# Patient Record
Sex: FEMALE | Race: WHITE | NOT HISPANIC OR LATINO | Employment: OTHER | URBAN - NONMETROPOLITAN AREA
[De-identification: names, ages, dates, MRNs, and addresses within clinical notes are randomized per-mention and may not be internally consistent; named-entity substitution may affect disease eponyms.]

---

## 2017-09-06 ENCOUNTER — TRANSCRIBE ORDERS (OUTPATIENT)
Dept: ADMINISTRATIVE | Facility: HOSPITAL | Age: 77
End: 2017-09-06

## 2017-09-06 DIAGNOSIS — Z12.31 VISIT FOR SCREENING MAMMOGRAM: Primary | ICD-10-CM

## 2017-10-05 ENCOUNTER — APPOINTMENT (OUTPATIENT)
Dept: MAMMOGRAPHY | Facility: HOSPITAL | Age: 77
End: 2017-10-05
Attending: FAMILY MEDICINE

## 2017-10-13 ENCOUNTER — APPOINTMENT (OUTPATIENT)
Dept: MAMMOGRAPHY | Facility: HOSPITAL | Age: 77
End: 2017-10-13
Attending: FAMILY MEDICINE

## 2017-11-20 ENCOUNTER — TRANSCRIBE ORDERS (OUTPATIENT)
Dept: ADMINISTRATIVE | Facility: HOSPITAL | Age: 77
End: 2017-11-20

## 2017-11-20 DIAGNOSIS — Z12.31 VISIT FOR SCREENING MAMMOGRAM: Primary | ICD-10-CM

## 2017-12-21 ENCOUNTER — HOSPITAL ENCOUNTER (OUTPATIENT)
Dept: MAMMOGRAPHY | Facility: HOSPITAL | Age: 77
Discharge: HOME | End: 2017-12-21
Attending: FAMILY MEDICINE | Admitting: FAMILY MEDICINE

## 2017-12-21 DIAGNOSIS — Z12.31 VISIT FOR SCREENING MAMMOGRAM: ICD-10-CM

## 2017-12-21 PROCEDURE — G0202 SCR MAMMO BI INCL CAD: HCPCS

## 2017-12-21 PROCEDURE — 77063 BREAST TOMOSYNTHESIS BI: CPT

## 2017-12-21 PROCEDURE — 77063 BREAST TOMOSYNTHESIS BI: CPT | Performed by: RADIOLOGY

## 2017-12-21 PROCEDURE — G0202 SCR MAMMO BI INCL CAD: HCPCS | Performed by: RADIOLOGY

## 2017-12-22 ENCOUNTER — EPISODE CHANGES (OUTPATIENT)
Dept: CASE MANAGEMENT | Facility: OTHER | Age: 77
End: 2017-12-22

## 2018-06-01 ENCOUNTER — TRANSCRIBE ORDERS (OUTPATIENT)
Dept: CARDIOLOGY | Facility: HOSPITAL | Age: 78
End: 2018-06-01

## 2018-06-01 DIAGNOSIS — R01.1 HEART MURMUR: Primary | ICD-10-CM

## 2018-06-07 ENCOUNTER — HOSPITAL ENCOUNTER (OUTPATIENT)
Dept: CARDIOLOGY | Facility: HOSPITAL | Age: 78
Discharge: HOME | End: 2018-06-07
Admitting: PHYSICIAN ASSISTANT

## 2018-06-07 DIAGNOSIS — R01.1 HEART MURMUR: ICD-10-CM

## 2018-06-07 LAB
BH CV ECHO MEAS - % IVS THICK: -25 %
BH CV ECHO MEAS - % LVPW THICK: 17.8 %
BH CV ECHO MEAS - ACS: 1.6 CM
BH CV ECHO MEAS - AO MAX PG: 21.6 MMHG
BH CV ECHO MEAS - AO MEAN PG: 9.4 MMHG
BH CV ECHO MEAS - AO ROOT AREA (BSA CORRECTED): 1.5
BH CV ECHO MEAS - AO ROOT AREA: 5.9 CM^2
BH CV ECHO MEAS - AO ROOT DIAM: 2.7 CM
BH CV ECHO MEAS - AO V2 MAX: 232.4 CM/SEC
BH CV ECHO MEAS - AO V2 MEAN: 137.9 CM/SEC
BH CV ECHO MEAS - AO V2 VTI: 56.3 CM
BH CV ECHO MEAS - BSA(HAYCOCK): 1.9 M^2
BH CV ECHO MEAS - BSA: 1.8 M^2
BH CV ECHO MEAS - BZI_BMI: 31.4 KILOGRAMS/M^2
BH CV ECHO MEAS - BZI_METRIC_HEIGHT: 160 CM
BH CV ECHO MEAS - BZI_METRIC_WEIGHT: 80.3 KG
BH CV ECHO MEAS - CONTRAST EF 4CH: 70.2 ML/M^2
BH CV ECHO MEAS - EDV(CUBED): 82.2 ML
BH CV ECHO MEAS - EDV(MOD-SP4): 47 ML
BH CV ECHO MEAS - EDV(TEICH): 85.3 ML
BH CV ECHO MEAS - EF(CUBED): 65.4 %
BH CV ECHO MEAS - EF(MOD-SP4): 70.2 %
BH CV ECHO MEAS - EF(TEICH): 57.2 %
BH CV ECHO MEAS - ESV(CUBED): 28.4 ML
BH CV ECHO MEAS - ESV(MOD-SP4): 14 ML
BH CV ECHO MEAS - ESV(TEICH): 36.5 ML
BH CV ECHO MEAS - FS: 29.8 %
BH CV ECHO MEAS - IVS/LVPW: 1.4
BH CV ECHO MEAS - IVSD: 1.5 CM
BH CV ECHO MEAS - IVSS: 1.1 CM
BH CV ECHO MEAS - LA DIMENSION: 3.9 CM
BH CV ECHO MEAS - LA/AO: 1.4
BH CV ECHO MEAS - LV DIASTOLIC VOL/BSA (35-75): 25.6 ML/M^2
BH CV ECHO MEAS - LV MASS(C)D: 202.3 GRAMS
BH CV ECHO MEAS - LV MASS(C)DI: 110.2 GRAMS/M^2
BH CV ECHO MEAS - LV MASS(C)S: 107.8 GRAMS
BH CV ECHO MEAS - LV MASS(C)SI: 58.7 GRAMS/M^2
BH CV ECHO MEAS - LV SYSTOLIC VOL/BSA (12-30): 7.6 ML/M^2
BH CV ECHO MEAS - LVIDD: 4.3 CM
BH CV ECHO MEAS - LVIDS: 3.1 CM
BH CV ECHO MEAS - LVLD AP4: 5.5 CM
BH CV ECHO MEAS - LVLS AP4: 4 CM
BH CV ECHO MEAS - LVOT AREA (M): 2.3 CM^2
BH CV ECHO MEAS - LVOT AREA: 2.3 CM^2
BH CV ECHO MEAS - LVOT DIAM: 1.7 CM
BH CV ECHO MEAS - LVPWD: 1.1 CM
BH CV ECHO MEAS - LVPWS: 1.2 CM
BH CV ECHO MEAS - MV A MAX VEL: 113.5 CM/SEC
BH CV ECHO MEAS - MV E MAX VEL: 107.1 CM/SEC
BH CV ECHO MEAS - MV E/A: 0.94
BH CV ECHO MEAS - PA ACC SLOPE: 1195 CM/SEC^2
BH CV ECHO MEAS - PA ACC TIME: 0.11 SEC
BH CV ECHO MEAS - PA PR(ACCEL): 31.5 MMHG
BH CV ECHO MEAS - RAP SYSTOLE: 10 MMHG
BH CV ECHO MEAS - RVDD: 0.81 CM
BH CV ECHO MEAS - RVSP: 82.5 MMHG
BH CV ECHO MEAS - SI(AO): 180.6 ML/M^2
BH CV ECHO MEAS - SI(CUBED): 29.3 ML/M^2
BH CV ECHO MEAS - SI(MOD-SP4): 18 ML/M^2
BH CV ECHO MEAS - SI(TEICH): 26.6 ML/M^2
BH CV ECHO MEAS - SV(AO): 331.5 ML
BH CV ECHO MEAS - SV(CUBED): 53.8 ML
BH CV ECHO MEAS - SV(MOD-SP4): 33 ML
BH CV ECHO MEAS - SV(TEICH): 48.8 ML
BH CV ECHO MEAS - TR MAX VEL: 425.7 CM/SEC

## 2018-06-07 PROCEDURE — 93306 TTE W/DOPPLER COMPLETE: CPT

## 2018-06-07 PROCEDURE — 93306 TTE W/DOPPLER COMPLETE: CPT | Performed by: INTERNAL MEDICINE

## 2018-07-30 ENCOUNTER — TRANSCRIBE ORDERS (OUTPATIENT)
Dept: CARDIOLOGY | Facility: CLINIC | Age: 78
End: 2018-07-30

## 2018-07-30 ENCOUNTER — HOSPITAL ENCOUNTER (OUTPATIENT)
Dept: GENERAL RADIOLOGY | Facility: HOSPITAL | Age: 78
Discharge: HOME | End: 2018-07-30
Admitting: INTERNAL MEDICINE

## 2018-07-30 DIAGNOSIS — I05.0 MITRAL VALVE STENOSIS, UNSPECIFIED ETIOLOGY: ICD-10-CM

## 2018-07-30 DIAGNOSIS — I05.0 MITRAL VALVE STENOSIS, UNSPECIFIED ETIOLOGY: Primary | ICD-10-CM

## 2018-07-30 PROCEDURE — 71046 X-RAY EXAM CHEST 2 VIEWS: CPT | Performed by: RADIOLOGY

## 2018-07-30 PROCEDURE — 71046 X-RAY EXAM CHEST 2 VIEWS: CPT

## 2018-08-07 ENCOUNTER — OFFICE VISIT (OUTPATIENT)
Dept: CARDIOLOGY | Facility: CLINIC | Age: 78
End: 2018-08-07

## 2018-08-07 VITALS
WEIGHT: 178 LBS | SYSTOLIC BLOOD PRESSURE: 160 MMHG | HEART RATE: 77 BPM | BODY MASS INDEX: 32.76 KG/M2 | OXYGEN SATURATION: 94 % | HEIGHT: 62 IN | DIASTOLIC BLOOD PRESSURE: 67 MMHG

## 2018-08-07 DIAGNOSIS — I05.9 MITRAL VALVE DISORDER: ICD-10-CM

## 2018-08-07 DIAGNOSIS — R94.39 ABNORMAL STRESS TEST: Primary | ICD-10-CM

## 2018-08-07 PROCEDURE — 93000 ELECTROCARDIOGRAM COMPLETE: CPT | Performed by: INTERNAL MEDICINE

## 2018-08-07 PROCEDURE — 99213 OFFICE O/P EST LOW 20 MIN: CPT | Performed by: INTERNAL MEDICINE

## 2018-08-07 RX ORDER — MECLIZINE HYDROCHLORIDE 25 MG/1
25 TABLET ORAL 3 TIMES DAILY PRN
COMMUNITY

## 2018-08-07 RX ORDER — NITROGLYCERIN 0.4 MG/1
0.4 TABLET SUBLINGUAL
COMMUNITY

## 2018-08-07 RX ORDER — TIMOLOL MALEATE 5 MG/ML
1 SOLUTION/ DROPS OPHTHALMIC 2 TIMES DAILY
COMMUNITY

## 2018-08-07 RX ORDER — PANTOPRAZOLE SODIUM 40 MG/1
40 TABLET, DELAYED RELEASE ORAL DAILY
COMMUNITY

## 2018-08-07 RX ORDER — BRIMONIDINE TARTRATE 2 MG/ML
1 SOLUTION/ DROPS OPHTHALMIC 2 TIMES DAILY
COMMUNITY

## 2018-08-07 NOTE — PROGRESS NOTES
"[facility]  INITIAL OFFICE CONSULT    Elmo Martinez  : 1940  MRN:[id number]  Home Phone:[phone]  Patient Address: [facility] [place] [address]    Date of Encounter: 2018    PCP: Emmanuel Marcum MD  [address] [address]  Referring MD: SILVIANO Mauricio      IDENTIFICATION: A 77 y.o. female     Chief Complaint   Patient presents with   • Mitral valve stenosis   Pulmonary HTN by echocardiogram    PROBLEM LIST:   1. Chest pain  1. Echocardiogram with LVEF >65%, mild LVH 3/29/13  2. Normal regadenoson cardiolite stress with LVEF 76% 3/29/13  3. Echocardiogram LVEF 60%-65%, mod MR, mild to mod TR and evidence severe pulmonary HTN 16  4. Abnormal lexiscan nuclear stress 16  5. LHC, 2016:  Normal LV function, normal coronaries, normal pulmonary artery pressures.  6. Echo, 2018:  ... \"severe PA hypertension\" and mild MS\"  2. Hypertension  3. Untreated dyslipidemia  4. Diabetes Mellitus with neuropathy  5. Palpitations   6. GERD and hiatal hernia  7. Osteoarthritis  8. Skin cancer  9. Glaucoma  10. History of gout  11. Scoliosis  12. Small stomach ulcers  13. Surgical history: hysterectomy, glaucoma, appendectomy      Allergies   Allergen Reactions   • Codeine Mental Status Change   • Meperidine Mental Status Change   • Statins Myalgia     Zocor, lipitor, welchol and pravachol   • Sulfa Antibiotics Rash   • Other Swelling     Swelling at site of insertion   • Flu Virus Vaccine Swelling       CURRENT MEDICATIONS:   •  ALPRAZolam 0.5 mg by mouth 2 (two) times a day as needed.    •  amLODIPine 5 mg by mouth daily  •  aspirin 81 MG by mouth daily  •  brimonidine (ALPHAGAN) 0.2 % ophthalmic solution, 1 drop 2 (Two) Times a Day.  •  diclofenac 75 mg by mouth 2 (Two) Times a Day  •  FIBER PO, Take  by mouth Daily  •  gabapentin 800 MG by mouth 2 (two) times a day.  •  HYDROcodone-acetaminophen (NORCO) 7.5-325 MG by mouth every 12 (twelve) " "hours as needed   •  latanoprost (XALATAN) 0.005 % ophthalmic solution, Administer 1 drop to both eyes every night  •  lisinopril 20 mg by mouth 2 (two) times a day.  •  meclizine 25 mg by mouth 3 (Three) Times a Day As Needed for dizziness  •  metoprolol tartrate 50 mg by mouth 2 (two) times a day  •  nitroglycerin (NITROSTAT) 0.4 MG SL As Needed for Chest Pain.    •  Omega-3 Fatty Acids by mouth Daily.  •  pantoprazole 40 MG EC by mouth Daily.  •  Pseudoephedrine-Acetaminophen by mouth Daily As Needed.  •  timolol (TIMOPTIC) 0.5 % ophthalmic solution, Administer 1 drop to both eyes 2 (Two) Times a Day.  •  vitamin E 200 Units by mouth Daily.      HPI:  The patient returns for follow-up today.  This pleasant elderly woman underwent studies in the [facility] catheter labs 2 years ago, in June 2016.  They were to evaluate \"mixed feature\" chest pain is well as severe pulmonary artery hypertension (the diagnosis was made by an echocardiogram).  The patient was found to have normal LV function, normal coronary arteries, and normal pulmonary artery pressure.    In follow-up with her primary care physician recently she was found to have a?  New heart murmur and an echocardiogram was done.  The echocardiogram was again interpreted as showing severe pulmonary artery hypertension as well as, on this occasion, mild AI and the mild mitral stenosis.  She was referred back to me.  She has some complaints of easy fatigability and ill-defined exertional dyspnea which is basically been unchanged over the last 2 years.  She has not had chest pain.      Cardiac Risk Factors include: advanced age (older than 55 for men, 65 for women), diabetes mellitus, dyslipidemia, hypertension and sedentary lifestyle    ROS: All systems have been reviewed and are negative with the exception of those mentioned in the HPI and problem list above.    Surgical History:   Past Surgical History:   Procedure Laterality Date   • CARDIAC CATHETERIZATION N/A " 6/22/2016    Procedure: right and left heart, abnormal stress, abnormal echo revealing pulm HTN;  Surgeon: Pancho Casey MD;  Location: [facility] INVASIVE LOCATION;  Service:    • CATARACT EXTRACTION     • COLONOSCOPY      2015   • GLAUCOMA SURGERY     • HYSTERECTOMY     • TUBAL ABDOMINAL LIGATION         Social History:   Social History     Social History   • Marital status:      Spouse name: N/A   • Number of children: N/A   • Years of education: N/A     Occupational History   • Not on file.     Social History Main Topics   • Smoking status: Never Smoker   • Smokeless tobacco: Not on file   • Alcohol use No   • Drug use: No   • Sexual activity: Defer     Other Topics Concern   • Not on file     Social History Narrative   • No narrative on file       Family History:   Family History   Problem Relation Age of Onset   • Cancer Other    • Diabetes Other    • Gout Other    • Heart disease Other    • Hypertension Other    • Osteoarthritis Other    • Osteoporosis Other    • Rheum arthritis Other    • COPD Father    • Cancer Sister    • Breast cancer Neg Hx        Objective     Vitals:    08/07/18 1106 08/07/18 1107 08/07/18 1108 08/07/18 1109   BP: 155/61 148/61 158/52 160/67   BP Location: Right arm Right arm Left arm Left arm   Patient Position: Sitting Standing Standing Sitting   Pulse:  77 77 77   SpO2:       Weight:       Height:         Body mass index is 32.56 kg/m².    PHYSICAL EXAM:  CONSTITUTIONAL: Well nourished, cooperative, in no acute distress  HEENT: Normocephalic, atraumatic, PERRLA, no JVD, no carotid bruit  CARDIOVASCULAR:  Regular rhythm and normal rate, no gallop, rub. There is a faint systolic murmur wothout any diastolic component. Peripheral pulses are present and equal bilaterally  RESPIRATORY: Clear to auscultation, normal respiratory effort, no wheezing, rales or ronchi  GI: Soft, nontender, normal bowel sounds  MUSCULOSKELETAL: No gross deformities, no edema  SKIN: Warm, dry. No  "bleeding, bruising or rash  NEUROLOGICAL: No focal deficits  PSYCHIATRIC: Normal mood and affect. Behavior is normal     Labs/Diagnostic Data  6/25/2018  CBC:  WBC 7.7, HGB 10.6, HCT 34.2, PLTS 186  CMP: , BUN 10, CR 0.68, NA 4.9, K 4.9, AST 12, ALT 11  HGB A1C: 6.3  LIPID: , HDL 41, , TRIG 284  TSH: 2.870  T4: 1.06                                  ECG 12 Lead  Date/Time: 8/7/2018 1:59 PM  Performed by: ROSHAN KERNS  Authorized by: ROSHAN KERNS   Comparison: compared with previous ECG from 6/14/2016  Similar to previous ECG  Rhythm: sinus rhythm  BPM: 72  Clinical impression: normal ECG            Radiology Data:   7/30/2018  CXR:  No evidence of active disease in the chest.     Assessment and Plan:     1. Pulmonary artery hypertension: This patient's echo is unchanged from her echo in 2000 which suggests pulmonary artery hypertension.  Catheterization studies done in 2014 after the first echo clearly document normal pulmonary artery pressures by direct measurement with a Miracle-Imani catheter.  I therefore think that this is a spurious finding as the echocardiogram appears to be significantly overestimating her pulmonary pressures.  I would not pursue this further.  2.  Echocardiographic findings of \"mild mitral stenosis\".  I reviewed the echo myself.  This is not a rheumatic mitral valve.  Leaflets are probably a bit thickened.  There is mitral annular calcification.  I do not have recorded velocities from the study; however, while I cannot absolutely exclude a mild obstruction to LV inflow from mitral annular calcification, this patient does not have mitral stenosis.  3.  The patient's blood pressure is not at target.  She will follow-up with Emmanuel Marcum M.D. in terms of blood pressure control and other risk factor modification measures as indicated.  I do not think that she needs further cardiac study at this time.    Thank you for allowing me to participate in the care of Elmo CUBA " Juan. Feel free to contact me directly with any further questions or concerns.    Scribed for Pancho Casey MD by Amita Lassiter RN. 8/7/2018  11:50 AM

## 2019-09-18 ENCOUNTER — APPOINTMENT (OUTPATIENT)
Dept: GENERAL RADIOLOGY | Facility: HOSPITAL | Age: 79
End: 2019-09-18

## 2019-09-18 ENCOUNTER — OFFICE VISIT (OUTPATIENT)
Dept: ORTHOPEDIC SURGERY | Facility: CLINIC | Age: 79
End: 2019-09-18

## 2019-09-18 VITALS
WEIGHT: 180 LBS | HEIGHT: 63 IN | DIASTOLIC BLOOD PRESSURE: 74 MMHG | BODY MASS INDEX: 31.89 KG/M2 | SYSTOLIC BLOOD PRESSURE: 150 MMHG | HEART RATE: 71 BPM

## 2019-09-18 DIAGNOSIS — Z91.81 HISTORY OF RECENT FALL: ICD-10-CM

## 2019-09-18 DIAGNOSIS — M25.562 ACUTE PAIN OF LEFT KNEE: Primary | ICD-10-CM

## 2019-09-18 PROCEDURE — 20610 DRAIN/INJ JOINT/BURSA W/O US: CPT | Performed by: ORTHOPAEDIC SURGERY

## 2019-09-18 PROCEDURE — 99203 OFFICE O/P NEW LOW 30 MIN: CPT | Performed by: ORTHOPAEDIC SURGERY

## 2019-09-18 RX ORDER — LIDOCAINE HYDROCHLORIDE 20 MG/ML
2 INJECTION, SOLUTION INFILTRATION; PERINEURAL
Status: COMPLETED | OUTPATIENT
Start: 2019-09-18 | End: 2019-09-18

## 2019-09-18 RX ORDER — METHYLPREDNISOLONE ACETATE 40 MG/ML
40 INJECTION, SUSPENSION INTRA-ARTICULAR; INTRALESIONAL; INTRAMUSCULAR; SOFT TISSUE
Status: COMPLETED | OUTPATIENT
Start: 2019-09-18 | End: 2019-09-18

## 2019-09-18 RX ADMIN — LIDOCAINE HYDROCHLORIDE 2 ML: 20 INJECTION, SOLUTION INFILTRATION; PERINEURAL at 16:37

## 2019-09-18 RX ADMIN — METHYLPREDNISOLONE ACETATE 40 MG: 40 INJECTION, SUSPENSION INTRA-ARTICULAR; INTRALESIONAL; INTRAMUSCULAR; SOFT TISSUE at 16:37

## 2019-09-18 NOTE — PROGRESS NOTES
Follow-up Visit         Patient: Elmo Martinez  YOB: 1940  Date of Encounter: 09/18/2019      Chief  Complaint: No chief complaint on file.        HPI:  Elmo Martinez, 79 y.o. female     Medical History:  Patient Active Problem List   Diagnosis   • Abnormal stress test   • Pulmonary artery hypertension (CMS/HCC)   • Mitral valve disorder     Past Medical History:   Diagnosis Date   • Balance problem     lack of balance between leisure activites and work   • Coronary artery disease    • Diabetes mellitus (CMS/HCC)     diet controlled. Checks blood sugar once a day. diagnosed 4 years   • GERD (gastroesophageal reflux disease)    • Glaucoma    • Gout    • Heart palpitations    • Hiatal hernia    • History of transfusion     1963   • Hypercholesterolemia    • Hypertension    • Low back pain    • Mitral valve stenosis    • Osteoarthritis    • Osteoporosis    • PONV (postoperative nausea and vomiting)    • Scoliosis    • Skin cancer    • Spinal headache     mild   • Wears glasses        Social History:  Social History     Socioeconomic History   • Marital status:      Spouse name: Not on file   • Number of children: Not on file   • Years of education: Not on file   • Highest education level: Not on file   Tobacco Use   • Smoking status: Never Smoker   Substance and Sexual Activity   • Alcohol use: No   • Drug use: No   • Sexual activity: Defer       Surgical History:  Past Surgical History:   Procedure Laterality Date   • CARDIAC CATHETERIZATION N/A 6/22/2016    Procedure: right and left heart, abnormal stress, abnormal echo revealing pulm HTN;  Surgeon: Pancho Casey MD;  Location: [facility] INVASIVE LOCATION;  Service:    • CATARACT EXTRACTION     • COLONOSCOPY      2015   • GLAUCOMA SURGERY     • HYSTERECTOMY     • TUBAL ABDOMINAL LIGATION         Radiology:       Examination:       Assessment & Plan:   79 y.o. female          Diagnosis Plan   1. Left knee pain, unspecified chronicity  XR  Knee 3 View Left             Cc:  Emmanuel Marcum MD              This document has been electronically signed by Tomas Mcgraw MD   September 18, 2019 1:19 PM

## 2019-09-18 NOTE — PROGRESS NOTES
New Patient Visit      Patient: Elmo Martinez  YOB: 1940  Date of Encounter: 09/18/2019        Chief Complaint:   Chief Complaint   Patient presents with   • Left Knee - Pain, Initial Evaluation         HPI:   Elmo Martinez, 79 y.o. female, referred by Emmanuel Marcum MD for evaluation of left knee injury sustained June 29, 2019.  Approximately 2-1/2 months ago was putting up a water hose when she tripped and fell forward.  She felt as though her knee popped.  Since then she has had stiffness in her knee and pain with weightbearing.  She denies problems with her left knee prior to this injury.  She is known to have osteoarthritis but was not aware of involvement to her left knee.  She denies a history of rheumatoid arthritis or family history of rheumatoid arthritis.  Currently she continues to have pain with ambulation she has moderate pain at rest.  She has not experienced additional popping sensation in her knee.      Active Problem List:  Patient Active Problem List   Diagnosis   • Abnormal stress test   • Pulmonary artery hypertension (CMS/HCC)   • Mitral valve disorder         Past Medical History:  Past Medical History:   Diagnosis Date   • Balance problem     lack of balance between leisure activites and work   • Coronary artery disease    • Diabetes mellitus (CMS/HCC)     diet controlled. Checks blood sugar once a day. diagnosed 4 years   • GERD (gastroesophageal reflux disease)    • Glaucoma    • Gout    • Heart palpitations    • Hiatal hernia    • History of transfusion     1963   • Hypercholesterolemia    • Hypertension    • Low back pain    • Mitral valve stenosis    • Osteoarthritis    • Osteoporosis    • PONV (postoperative nausea and vomiting)    • Scoliosis    • Skin cancer    • Spinal headache     mild   • Wears glasses          Past Surgical History:  Past Surgical History:   Procedure Laterality Date   • CARDIAC CATHETERIZATION N/A 6/22/2016    Procedure: right and left heart,  abnormal stress, abnormal echo revealing pulm HTN;  Surgeon: Pancho Casey MD;  Location: [facility] INVASIVE LOCATION;  Service:    • CATARACT EXTRACTION     • COLONOSCOPY      2015   • GLAUCOMA SURGERY     • HYSTERECTOMY     • TUBAL ABDOMINAL LIGATION           Family History:  Family History   Problem Relation Age of Onset   • Cancer Other    • Diabetes Other    • Gout Other    • Heart disease Other    • Hypertension Other    • Osteoarthritis Other    • Osteoporosis Other    • Rheum arthritis Other    • Osteoarthritis Mother    • Cancer Mother    • Hypertension Mother    • COPD Father    • Cancer Sister    • Osteoarthritis Sister    • Hypertension Sister    • Cancer Maternal Grandfather    • Breast cancer Neg Hx          Social History:  Social History     Socioeconomic History   • Marital status:      Spouse name: Not on file   • Number of children: Not on file   • Years of education: Not on file   • Highest education level: Not on file   Tobacco Use   • Smoking status: Never Smoker   • Smokeless tobacco: Never Used   Substance and Sexual Activity   • Alcohol use: No   • Drug use: No   • Sexual activity: Defer     Body mass index is 31.89 kg/m².      Medications:  Current Outpatient Medications   Medication Sig Dispense Refill   • ALPRAZolam (XANAX) 0.5 MG tablet Take 0.5 mg by mouth 2 (two) times a day as needed. Takes 0.25 prn     • amLODIPine (NORVASC) 5 MG tablet Take 5 mg by mouth daily.     • aspirin 81 MG tablet Take 81 mg by mouth daily.     • brimonidine (ALPHAGAN) 0.2 % ophthalmic solution 1 drop 2 (Two) Times a Day.     • diclofenac (VOLTAREN) 75 MG EC tablet Take 75 mg by mouth 2 (Two) Times a Day.     • FIBER PO Take  by mouth Daily.     • gabapentin (NEURONTIN) 800 MG tablet Take 800 mg by mouth 2 (two) times a day.     • HYDROcodone-acetaminophen (NORCO) 7.5-325 MG per tablet Take 1 tablet by mouth every 12 (twelve) hours as needed for moderate pain (4-6) (right shoulder pain).     •  latanoprost (XALATAN) 0.005 % ophthalmic solution Administer 1 drop to both eyes every night.     • lisinopril (PRINIVIL,ZESTRIL) 20 MG tablet Take 20 mg by mouth 2 (two) times a day.     • meclizine (ANTIVERT) 25 MG tablet Take 25 mg by mouth 3 (Three) Times a Day As Needed for dizziness.     • metoprolol tartrate (LOPRESSOR) 50 MG tablet Take 50 mg by mouth 2 (two) times a day.     • nitroglycerin (NITROSTAT) 0.4 MG SL tablet Place 0.4 mg under the tongue Every 5 (Five) Minutes As Needed for Chest Pain. Take no more than 3 doses in 15 minutes.     • Omega-3 Fatty Acids (OMEGA 3 PO) Take  by mouth Daily.     • pantoprazole (PROTONIX) 40 MG EC tablet Take 40 mg by mouth Daily.     • Pseudoephedrine-Acetaminophen (SINUS RELIEF PO) Take  by mouth Daily As Needed.     • timolol (TIMOPTIC) 0.5 % ophthalmic solution Administer 1 drop to both eyes 2 (Two) Times a Day.     • vitamin E 100 UNIT capsule Take 200 Units by mouth Daily.       No current facility-administered medications for this visit.          Allergies:  Allergies   Allergen Reactions   • Codeine Mental Status Change   • Meperidine Mental Status Change   • Statins Myalgia     Zocor, lipitor, welchol and pravachol   • Sulfa Antibiotics Rash   • Other Swelling     Swelling at site of insertion   • Flu Virus Vaccine Swelling         Review of Systems:   Review of Systems   Constitutional: Positive for fatigue.   HENT: Positive for ear pain and sinus pain.    Eyes: Negative.    Respiratory: Negative.    Cardiovascular: Positive for palpitations and leg swelling.   Gastrointestinal: Positive for abdominal distention, constipation and diarrhea.   Endocrine: Positive for cold intolerance and heat intolerance.   Genitourinary: Negative.    Musculoskeletal: Positive for arthralgias, back pain, gait problem, joint swelling and myalgias.   Skin: Negative.    Allergic/Immunologic: Negative.    Neurological: Positive for weakness and numbness.   Hematological: Negative.   "  Psychiatric/Behavioral: Negative.          Physical Exam:   Physical Exam  GENERAL: 79 y.o. female, alert and oriented X 3 in no acute distress.   Visit Vitals  /74   Pulse 71   Ht 160 cm (63\")   Wt 81.6 kg (180 lb)   LMP  (LMP Unknown)   BMI 31.89 kg/m²     Musculoskeletal:   Left knee examination reveals mild effusion with moderate medial joint line tenderness is also moderate anterolateral joint line tenderness.  He has no gross instability with varus valgus stressing she demonstrates full extension flexes to 120 degrees.  Lockman and drawer are negative.  Neurovascular examination grossly intact she demonstrates normal patellar tracking with no significant crepitance.      Radiology/Labs:    Radiographs left knee from outside source reports mild arthritis medial compartment with no fracture.  Review confirms with the x-rays more consistent with rheumatoid arthritis then osteoarthritis that she only demonstrates osteopenia with joint space narrowing no subchondral sclerosis or cystic changes.      Assessment & Plan:   79 y.o. female with relatively minor injury to her left knee 2-1/2 months ago with persistent pain clinical findings supporting this meniscal tear or ligament injury.  Today after discussion we provided her intra-articular injection Depo-Medrol 40 mg lidocaine block.  She demonstrated immediate relief was able to ambulate without pain.  Test on her response to steroid injection I suspect she does have intra-articular pathology.  Is could be simply aggravation of her pre-existing arthritis.  I think it is reasonable to observe.  Should she fail to improve would consider MRI given her history of popping at the time of her injury.  She will return as needed.      ICD-10-CM ICD-9-CM   1. Acute pain of left knee M25.562 719.46   2. History of recent fall Z91.81 V15.88     Large Joint Arthrocentesis: L knee  Date/Time: 9/18/2019 4:37 PM  Consent given by: patient  Timeout: Immediately prior to " procedure a time out was called to verify the correct patient, procedure, equipment, support staff and site/side marked as required   Supporting Documentation  Indications: pain   Procedure Details  Location: knee - L knee  Preparation: Patient was prepped and draped in the usual sterile fashion  Needle size: 25 G  Approach: anterolateral  Medications administered: 40 mg methylPREDNISolone acetate 40 MG/ML; 2 mL lidocaine 2%  Patient tolerance: patient tolerated the procedure well with no immediate complications              Cc:   Emmanuel Marcum MD                This document has been electronically signed by Tomas Mcgraw MD   September 18, 2019 1:51 PM

## 2019-12-19 ENCOUNTER — APPOINTMENT (OUTPATIENT)
Dept: MAMMOGRAPHY | Facility: HOSPITAL | Age: 79
End: 2019-12-19

## 2020-01-05 ENCOUNTER — HOSPITAL ENCOUNTER (EMERGENCY)
Facility: HOSPITAL | Age: 80
Discharge: HOME | End: 2020-01-05
Attending: FAMILY MEDICINE | Admitting: FAMILY MEDICINE

## 2020-01-05 VITALS
RESPIRATION RATE: 18 BRPM | HEART RATE: 54 BPM | TEMPERATURE: 98.3 F | BODY MASS INDEX: 33.66 KG/M2 | OXYGEN SATURATION: 100 % | HEIGHT: 63 IN | SYSTOLIC BLOOD PRESSURE: 128 MMHG | WEIGHT: 190 LBS | DIASTOLIC BLOOD PRESSURE: 58 MMHG

## 2020-01-05 DIAGNOSIS — I10 HYPERTENSION, UNSPECIFIED TYPE: ICD-10-CM

## 2020-01-05 DIAGNOSIS — R04.0 EPISTAXIS: Primary | ICD-10-CM

## 2020-01-05 LAB
ALBUMIN SERPL-MCNC: 4.86 G/DL (ref 3.5–5.2)
ALBUMIN/GLOB SERPL: 1.5 G/DL
ALP SERPL-CCNC: 74 U/L (ref 39–117)
ALT SERPL W P-5'-P-CCNC: 13 U/L (ref 1–33)
ANION GAP SERPL CALCULATED.3IONS-SCNC: 15.3 MMOL/L (ref 5–15)
APTT PPP: 30.1 SECONDS (ref 23.8–36.1)
AST SERPL-CCNC: 23 U/L (ref 1–32)
BASOPHILS # BLD AUTO: 0.17 10*3/MM3 (ref 0–0.2)
BASOPHILS NFR BLD AUTO: 1 % (ref 0–1.5)
BILIRUB SERPL-MCNC: 0.4 MG/DL (ref 0.2–1.2)
BUN BLD-MCNC: 8 MG/DL (ref 8–23)
BUN/CREAT SERPL: 11.3 (ref 7–25)
CALCIUM SPEC-SCNC: 10 MG/DL (ref 8.6–10.5)
CHLORIDE SERPL-SCNC: 99 MMOL/L (ref 98–107)
CO2 SERPL-SCNC: 25.7 MMOL/L (ref 22–29)
CREAT BLD-MCNC: 0.71 MG/DL (ref 0.57–1)
DEPRECATED RDW RBC AUTO: 41.1 FL (ref 37–54)
EOSINOPHIL # BLD AUTO: 0.22 10*3/MM3 (ref 0–0.4)
EOSINOPHIL NFR BLD AUTO: 1.4 % (ref 0.3–6.2)
ERYTHROCYTE [DISTWIDTH] IN BLOOD BY AUTOMATED COUNT: 13.2 % (ref 12.3–15.4)
GFR SERPL CREATININE-BSD FRML MDRD: 80 ML/MIN/1.73
GLOBULIN UR ELPH-MCNC: 3.1 GM/DL
GLUCOSE BLD-MCNC: 145 MG/DL (ref 65–99)
HCT VFR BLD AUTO: 43.2 % (ref 34–46.6)
HGB BLD-MCNC: 13.5 G/DL (ref 12–15.9)
IMM GRANULOCYTES # BLD AUTO: 0.07 10*3/MM3 (ref 0–0.05)
IMM GRANULOCYTES NFR BLD AUTO: 0.4 % (ref 0–0.5)
INR PPP: 0.93 (ref 0.9–1.1)
LYMPHOCYTES # BLD AUTO: 2.13 10*3/MM3 (ref 0.7–3.1)
LYMPHOCYTES NFR BLD AUTO: 13.1 % (ref 19.6–45.3)
MCH RBC QN AUTO: 26.5 PG (ref 26.6–33)
MCHC RBC AUTO-ENTMCNC: 31.3 G/DL (ref 31.5–35.7)
MCV RBC AUTO: 84.9 FL (ref 79–97)
MONOCYTES # BLD AUTO: 1.08 10*3/MM3 (ref 0.1–0.9)
MONOCYTES NFR BLD AUTO: 6.7 % (ref 5–12)
NEUTROPHILS # BLD AUTO: 12.54 10*3/MM3 (ref 1.7–7)
NEUTROPHILS NFR BLD AUTO: 77.4 % (ref 42.7–76)
NRBC BLD AUTO-RTO: 0 /100 WBC (ref 0–0.2)
PLATELET # BLD AUTO: 264 10*3/MM3 (ref 140–450)
PMV BLD AUTO: 12.1 FL (ref 6–12)
POTASSIUM BLD-SCNC: 4.8 MMOL/L (ref 3.5–5.2)
PROT SERPL-MCNC: 8 G/DL (ref 6–8.5)
PROTHROMBIN TIME: 12.9 SECONDS (ref 11–15.4)
RBC # BLD AUTO: 5.09 10*6/MM3 (ref 3.77–5.28)
SODIUM BLD-SCNC: 140 MMOL/L (ref 136–145)
WBC NRBC COR # BLD: 16.21 10*3/MM3 (ref 3.4–10.8)

## 2020-01-05 PROCEDURE — 80053 COMPREHEN METABOLIC PANEL: CPT | Performed by: NURSE PRACTITIONER

## 2020-01-05 PROCEDURE — 85730 THROMBOPLASTIN TIME PARTIAL: CPT | Performed by: NURSE PRACTITIONER

## 2020-01-05 PROCEDURE — 85610 PROTHROMBIN TIME: CPT | Performed by: NURSE PRACTITIONER

## 2020-01-05 PROCEDURE — 85025 COMPLETE CBC W/AUTO DIFF WBC: CPT | Performed by: NURSE PRACTITIONER

## 2020-01-05 PROCEDURE — 99284 EMERGENCY DEPT VISIT MOD MDM: CPT

## 2020-01-05 RX ORDER — CLONIDINE HYDROCHLORIDE 0.1 MG/1
0.2 TABLET ORAL ONCE
Status: COMPLETED | OUTPATIENT
Start: 2020-01-05 | End: 2020-01-05

## 2020-01-05 RX ORDER — SODIUM CHLORIDE 0.9 % (FLUSH) 0.9 %
10 SYRINGE (ML) INJECTION AS NEEDED
Status: DISCONTINUED | OUTPATIENT
Start: 2020-01-05 | End: 2020-01-05 | Stop reason: HOSPADM

## 2020-01-05 RX ADMIN — CLONIDINE HYDROCHLORIDE 0.2 MG: 0.1 TABLET ORAL at 18:07

## 2020-01-05 NOTE — ED PROVIDER NOTES
Subjective     History provided by:  Patient   used: No    Nose Bleed   Location:  L nare  Severity:  Moderate  Duration:  5 hours  Progression:  Resolved  Chronicity:  Recurrent (occured once last week but no more until today)  Context: aspirin use    Relieved by:  Nothing  Worsened by:  Nothing  Ineffective treatments:  Applying pressure  Associated symptoms: no blood in oropharynx, no congestion, no cough, no fever and no headaches    Risk factors comment:  Hypertension      Review of Systems   Constitutional: Negative.  Negative for fever.   HENT: Positive for nosebleeds. Negative for congestion.    Eyes: Negative.    Respiratory: Negative.  Negative for cough.    Cardiovascular: Negative.    Gastrointestinal: Negative.    Endocrine: Negative.    Genitourinary: Negative.    Musculoskeletal: Negative.    Skin: Negative.    Allergic/Immunologic: Negative.    Neurological: Negative.  Negative for headaches.   Hematological: Negative.    Psychiatric/Behavioral: Negative.        Past Medical History:   Diagnosis Date   • Balance problem     lack of balance between leisure activites and work   • Coronary artery disease    • Diabetes mellitus (CMS/[facility])     diet controlled. Checks blood sugar once a day. diagnosed 4 years   • GERD (gastroesophageal reflux disease)    • Glaucoma    • Gout    • Heart palpitations    • Hiatal hernia    • History of transfusion     1963   • Hypercholesterolemia    • Hypertension    • Low back pain    • Mitral valve stenosis    • Osteoarthritis    • Osteoporosis    • PONV (postoperative nausea and vomiting)    • Scoliosis    • Skin cancer    • Spinal headache     mild   • Wears glasses        Allergies   Allergen Reactions   • Codeine Mental Status Change   • Meperidine Mental Status Change   • Statins Myalgia     Zocor, lipitor, welchol and pravachol   • Sulfa Antibiotics Rash   • Other Swelling     Swelling at site of insertion   • Flu Virus Vaccine Swelling       Past  Surgical History:   Procedure Laterality Date   • CARDIAC CATHETERIZATION N/A 6/22/2016    Procedure: right and left heart, abnormal stress, abnormal echo revealing pulm HTN;  Surgeon: Pancho Casey MD;  Location: [facility] INVASIVE LOCATION;  Service:    • CATARACT EXTRACTION     • COLONOSCOPY      2015   • GLAUCOMA SURGERY     • HYSTERECTOMY     • TUBAL ABDOMINAL LIGATION         Family History   Problem Relation Age of Onset   • Cancer Other    • Diabetes Other    • Gout Other    • Heart disease Other    • Hypertension Other    • Osteoarthritis Other    • Osteoporosis Other    • Rheum arthritis Other    • Osteoarthritis Mother    • Cancer Mother    • Hypertension Mother    • COPD Father    • Cancer Sister    • Osteoarthritis Sister    • Hypertension Sister    • Cancer Maternal Grandfather    • Breast cancer Neg Hx        Social History     Socioeconomic History   • Marital status:      Spouse name: Not on file   • Number of children: Not on file   • Years of education: Not on file   • Highest education level: Not on file   Tobacco Use   • Smoking status: Never Smoker   • Smokeless tobacco: Never Used   Substance and Sexual Activity   • Alcohol use: No   • Drug use: No   • Sexual activity: Defer           Objective   Physical Exam   Constitutional: She is oriented to person, place, and time. She appears well-developed and well-nourished.   HENT:   Head: Normocephalic.   Right Ear: External ear normal.   Left Ear: External ear normal.   Mouth/Throat: Oropharynx is clear and moist.   Eyes: Pupils are equal, round, and reactive to light. Conjunctivae and EOM are normal.   Neck: Normal range of motion. Neck supple.   Cardiovascular: Normal rate, regular rhythm, normal heart sounds and intact distal pulses.   Pulmonary/Chest: Effort normal and breath sounds normal.   Abdominal: Soft. Bowel sounds are normal.   Musculoskeletal: Normal range of motion.   Neurological: She is alert and oriented to person,  place, and time.   Skin: Skin is warm and dry. Capillary refill takes less than 2 seconds.   Psychiatric: She has a normal mood and affect. Her behavior is normal. Thought content normal.   Nursing note and vitals reviewed.      Procedures           ED Course  ED Course as of Jan 09 1230   Sun Jan 05, 2020 2009 NO further bleeding while in ER. Discussed discharge instructions, treatment plan and follow up with patient's PCP and ENT.     [KP]      ED Course User Index  [KP] Thaddeus Stubbs, BETH                                               [facility]    Final diagnoses:   Epistaxis   Hypertension, unspecified type            Thaddeus Stubbs, BETH  01/09/20 1230

## 2020-01-06 NOTE — DISCHARGE INSTRUCTIONS
Follow up with Dr. Marcum and the ENT as needed.    Return to the emergency room for further continued bleeding or any other worsening or new symptoms.

## 2020-01-07 ENCOUNTER — PATIENT OUTREACH (OUTPATIENT)
Dept: CASE MANAGEMENT | Facility: OTHER | Age: 80
End: 2020-01-07

## 2020-01-07 ENCOUNTER — EPISODE CHANGES (OUTPATIENT)
Dept: CASE MANAGEMENT | Facility: OTHER | Age: 80
End: 2020-01-07

## 2020-01-07 NOTE — OUTREACH NOTE
"Patient Outreach Note    Patient was seen at [facility] ED 1/5/20 for hypertension and nosebleed. Patient's systolic b/p was as high as 204 in ED. Patient reports she saw Dr. Marcum yesterday, had labs drawn and was given an injection of Rocephin in the office and a prescription for antibiotics to take at home as Dr. Marcum felt she has a sinus infection. Patient states she has had a lot of sinus drainage. Patient also reports her amlodipine was increased from 5 mg to 10 mg. Patient confirms she checks her blood pressure at home. Patient reports still feeling \"a little woozy in the head\" today, states she usually has 4 cups of coffee a day but hasn't had any today. RN-ACM advised that caffeine withdrawal, hypertension, and a sinus infection are all potential reasons for headache and the latter two a potential cause of nosebleed, pt voiced understanding. Patient states she consumes a lot of sodium; states that prior to the nosebleed that brought her to the ED, she'd recently had popcorn, pretzels, pringles, and country ham. RN-ACM instructed on reading nutritional labels, pt voiced understanding, states she usually just looks at the sugar content but realizes now that she may need to look at the sodium as well. RN-ACM advised pt to try and stay under 2000 mg of sodium per day unless otherwise directed by physician, pt voiced understanding. RN-ACM also encouraged patient to increase water intake, especially while combating the sinus infection, pt voiced understanding. Patient agreeable to a follow up call.     Andreia Washington RN  Ambulatory     1/7/2020, 11:15 AM      "

## 2020-01-09 ENCOUNTER — PATIENT OUTREACH (OUTPATIENT)
Dept: CASE MANAGEMENT | Facility: OTHER | Age: 80
End: 2020-01-09

## 2020-01-09 NOTE — OUTREACH NOTE
Patient Outreach Note    Patient contacted RN-ACM to ask about blood pressure and blood pressure medication. Patient's amlodipine had been increased by her PCP from 5 mg a day to 10 mg a day (taken once daily). Patient reports she has checked her blood pressure and noted the following readings throughout the day: 123/50, 149/66, 117/61; patient asked if she should split the amlodipine up into two 5 mg doses taken a few hours apart. RN-ACM advised pt that she should continue taking it as a 10 mg dose QD as this is what her physician has prescribed; also advised that the readings are WNL. RN-ACM educated on hyper/hypotension and encouraged patient to notify PCP if her blood pressure becomes hypotensive as any medication changes would need to come from PCP, pt voiced understanding.     Andreia Washington RN  Ambulatory     1/9/2020, 2:42 PM

## 2020-01-25 ENCOUNTER — TRANSCRIBE ORDERS (OUTPATIENT)
Dept: ADMINISTRATIVE | Facility: HOSPITAL | Age: 80
End: 2020-01-25

## 2020-01-25 DIAGNOSIS — R51.9 INTRACTABLE HEADACHE, UNSPECIFIED CHRONICITY PATTERN, UNSPECIFIED HEADACHE TYPE: Primary | ICD-10-CM

## 2020-01-27 ENCOUNTER — HOSPITAL ENCOUNTER (OUTPATIENT)
Dept: MRI IMAGING | Facility: HOSPITAL | Age: 80
Discharge: HOME | End: 2020-01-27
Admitting: FAMILY MEDICINE

## 2020-01-27 DIAGNOSIS — R51.9 INTRACTABLE HEADACHE, UNSPECIFIED CHRONICITY PATTERN, UNSPECIFIED HEADACHE TYPE: ICD-10-CM

## 2020-01-27 PROCEDURE — 70551 MRI BRAIN STEM W/O DYE: CPT

## 2020-01-27 PROCEDURE — 70551 MRI BRAIN STEM W/O DYE: CPT | Performed by: RADIOLOGY

## 2020-02-20 ENCOUNTER — HOSPITAL ENCOUNTER (OUTPATIENT)
Dept: MAMMOGRAPHY | Facility: HOSPITAL | Age: 80
Discharge: HOME | End: 2020-02-20
Admitting: FAMILY MEDICINE

## 2020-02-20 DIAGNOSIS — Z12.31 VISIT FOR SCREENING MAMMOGRAM: ICD-10-CM

## 2020-02-20 PROCEDURE — 77063 BREAST TOMOSYNTHESIS BI: CPT

## 2020-02-20 PROCEDURE — 77067 SCR MAMMO BI INCL CAD: CPT | Performed by: RADIOLOGY

## 2020-02-20 PROCEDURE — 77067 SCR MAMMO BI INCL CAD: CPT

## 2020-02-20 PROCEDURE — 77063 BREAST TOMOSYNTHESIS BI: CPT | Performed by: RADIOLOGY

## 2020-02-24 ENCOUNTER — TRANSCRIBE ORDERS (OUTPATIENT)
Dept: ADMINISTRATIVE | Facility: HOSPITAL | Age: 80
End: 2020-02-24

## 2020-02-24 DIAGNOSIS — M25.562 CHRONIC PAIN OF LEFT KNEE: Primary | ICD-10-CM

## 2020-02-24 DIAGNOSIS — G89.29 CHRONIC PAIN OF LEFT KNEE: Primary | ICD-10-CM

## 2020-02-26 ENCOUNTER — HOSPITAL ENCOUNTER (OUTPATIENT)
Dept: MRI IMAGING | Facility: HOSPITAL | Age: 80
Discharge: HOME | End: 2020-02-26
Admitting: FAMILY MEDICINE

## 2020-02-26 DIAGNOSIS — M25.562 CHRONIC PAIN OF LEFT KNEE: ICD-10-CM

## 2020-02-26 DIAGNOSIS — G89.29 CHRONIC PAIN OF LEFT KNEE: ICD-10-CM

## 2020-02-26 PROCEDURE — 73721 MRI JNT OF LWR EXTRE W/O DYE: CPT | Performed by: RADIOLOGY

## 2020-02-26 PROCEDURE — 73721 MRI JNT OF LWR EXTRE W/O DYE: CPT

## 2020-08-19 ENCOUNTER — TRANSCRIBE ORDERS (OUTPATIENT)
Dept: ADMINISTRATIVE | Facility: HOSPITAL | Age: 80
End: 2020-08-19

## 2020-08-19 DIAGNOSIS — R09.89 LEFT CAROTID BRUIT: Primary | ICD-10-CM

## 2020-08-24 ENCOUNTER — HOSPITAL ENCOUNTER (OUTPATIENT)
Dept: CARDIOLOGY | Facility: HOSPITAL | Age: 80
Discharge: HOME | End: 2020-08-24
Admitting: FAMILY MEDICINE

## 2020-08-24 DIAGNOSIS — R09.89 LEFT CAROTID BRUIT: ICD-10-CM

## 2020-08-24 PROCEDURE — 93880 EXTRACRANIAL BILAT STUDY: CPT

## 2020-08-24 PROCEDURE — 93880 EXTRACRANIAL BILAT STUDY: CPT | Performed by: RADIOLOGY

## 2020-12-29 ENCOUNTER — TRANSCRIBE ORDERS (OUTPATIENT)
Dept: ADMINISTRATIVE | Facility: HOSPITAL | Age: 80
End: 2020-12-29

## 2020-12-29 DIAGNOSIS — R00.2 PALPITATION: Primary | ICD-10-CM

## 2021-01-04 ENCOUNTER — APPOINTMENT (OUTPATIENT)
Dept: CARDIOLOGY | Facility: HOSPITAL | Age: 81
End: 2021-01-04

## 2021-01-08 ENCOUNTER — APPOINTMENT (OUTPATIENT)
Dept: CARDIOLOGY | Facility: HOSPITAL | Age: 81
End: 2021-01-08

## 2021-01-12 ENCOUNTER — HOSPITAL ENCOUNTER (OUTPATIENT)
Dept: CARDIOLOGY | Facility: HOSPITAL | Age: 81
Discharge: HOME | End: 2021-01-12
Admitting: FAMILY MEDICINE

## 2021-01-12 DIAGNOSIS — R00.2 PALPITATION: ICD-10-CM

## 2021-01-12 LAB
BH CV ECHO MEAS - % IVS THICK: 0.04 %
BH CV ECHO MEAS - % LVPW THICK: 9.6 %
BH CV ECHO MEAS - ACS: 1.3 CM
BH CV ECHO MEAS - AO MAX PG (FULL): 17.2 MMHG
BH CV ECHO MEAS - AO MAX PG: 23.2 MMHG
BH CV ECHO MEAS - AO MEAN PG (FULL): 8 MMHG
BH CV ECHO MEAS - AO MEAN PG: 11 MMHG
BH CV ECHO MEAS - AO ROOT AREA (BSA CORRECTED): 1.5
BH CV ECHO MEAS - AO ROOT AREA: 6.2 CM^2
BH CV ECHO MEAS - AO ROOT DIAM: 2.8 CM
BH CV ECHO MEAS - AO V2 MAX: 241 CM/SEC
BH CV ECHO MEAS - AO V2 MEAN: 153 CM/SEC
BH CV ECHO MEAS - AO V2 VTI: 52 CM
BH CV ECHO MEAS - AVA(I,A): 1.7 CM^2
BH CV ECHO MEAS - AVA(I,D): 1.7 CM^2
BH CV ECHO MEAS - AVA(V,A): 1.6 CM^2
BH CV ECHO MEAS - AVA(V,D): 1.6 CM^2
BH CV ECHO MEAS - BSA(HAYCOCK): 2 M^2
BH CV ECHO MEAS - BSA: 1.9 M^2
BH CV ECHO MEAS - BZI_BMI: 33.7 KILOGRAMS/M^2
BH CV ECHO MEAS - BZI_METRIC_HEIGHT: 160 CM
BH CV ECHO MEAS - BZI_METRIC_WEIGHT: 86.2 KG
BH CV ECHO MEAS - EDV(CUBED): 88.7 ML
BH CV ECHO MEAS - EDV(MOD-SP4): 50.7 ML
BH CV ECHO MEAS - EDV(TEICH): 90.5 ML
BH CV ECHO MEAS - EF(CUBED): 74 %
BH CV ECHO MEAS - EF(MOD-SP4): 69.2 %
BH CV ECHO MEAS - EF(TEICH): 66.1 %
BH CV ECHO MEAS - ESV(CUBED): 23 ML
BH CV ECHO MEAS - ESV(MOD-SP4): 15.6 ML
BH CV ECHO MEAS - ESV(TEICH): 30.7 ML
BH CV ECHO MEAS - FS: 36.2 %
BH CV ECHO MEAS - IVS/LVPW: 1.1
BH CV ECHO MEAS - IVSD: 1.1 CM
BH CV ECHO MEAS - IVSS: 1.2 CM
BH CV ECHO MEAS - LA DIMENSION: 3.9 CM
BH CV ECHO MEAS - LA/AO: 1.4
BH CV ECHO MEAS - LV DIASTOLIC VOL/BSA (35-75): 26.8 ML/M^2
BH CV ECHO MEAS - LV MASS(C)D: 172.5 GRAMS
BH CV ECHO MEAS - LV MASS(C)DI: 91.1 GRAMS/M^2
BH CV ECHO MEAS - LV MASS(C)S: 94.8 GRAMS
BH CV ECHO MEAS - LV MASS(C)SI: 50.1 GRAMS/M^2
BH CV ECHO MEAS - LV MAX PG: 6.1 MMHG
BH CV ECHO MEAS - LV MEAN PG: 3 MMHG
BH CV ECHO MEAS - LV SYSTOLIC VOL/BSA (12-30): 8.2 ML/M^2
BH CV ECHO MEAS - LV V1 MAX: 123 CM/SEC
BH CV ECHO MEAS - LV V1 MEAN: 82.5 CM/SEC
BH CV ECHO MEAS - LV V1 VTI: 28.3 CM
BH CV ECHO MEAS - LVIDD: 4.5 CM
BH CV ECHO MEAS - LVIDS: 2.8 CM
BH CV ECHO MEAS - LVLD AP4: 6 CM
BH CV ECHO MEAS - LVLS AP4: 4.3 CM
BH CV ECHO MEAS - LVOT AREA (M): 3.1 CM^2
BH CV ECHO MEAS - LVOT AREA: 3.1 CM^2
BH CV ECHO MEAS - LVOT DIAM: 2 CM
BH CV ECHO MEAS - LVPWD: 1 CM
BH CV ECHO MEAS - LVPWS: 1.2 CM
BH CV ECHO MEAS - MV A MAX VEL: 109 CM/SEC
BH CV ECHO MEAS - MV E MAX VEL: 143 CM/SEC
BH CV ECHO MEAS - MV E/A: 1.3
BH CV ECHO MEAS - MV MAX PG: 11.6 MMHG
BH CV ECHO MEAS - MV MEAN PG: 4 MMHG
BH CV ECHO MEAS - MV V2 MAX: 170 CM/SEC
BH CV ECHO MEAS - MV V2 MEAN: 87.3 CM/SEC
BH CV ECHO MEAS - MV V2 VTI: 46.6 CM
BH CV ECHO MEAS - MVA(VTI): 1.9 CM^2
BH CV ECHO MEAS - PA ACC TIME: 0.12 SEC
BH CV ECHO MEAS - PA PR(ACCEL): 26.8 MMHG
BH CV ECHO MEAS - RAP SYSTOLE: 10 MMHG
BH CV ECHO MEAS - RVSP: 54 MMHG
BH CV ECHO MEAS - SI(AO): 169.2 ML/M^2
BH CV ECHO MEAS - SI(CUBED): 34.7 ML/M^2
BH CV ECHO MEAS - SI(LVOT): 47 ML/M^2
BH CV ECHO MEAS - SI(MOD-SP4): 18.6 ML/M^2
BH CV ECHO MEAS - SI(TEICH): 31.6 ML/M^2
BH CV ECHO MEAS - SV(AO): 320.2 ML
BH CV ECHO MEAS - SV(CUBED): 65.7 ML
BH CV ECHO MEAS - SV(LVOT): 88.9 ML
BH CV ECHO MEAS - SV(MOD-SP4): 35.1 ML
BH CV ECHO MEAS - SV(TEICH): 59.8 ML
BH CV ECHO MEAS - TR MAX VEL: 310 CM/SEC
MAXIMAL PREDICTED HEART RATE: 141 BPM
STRESS TARGET HR: 120 BPM

## 2021-01-12 PROCEDURE — 93306 TTE W/DOPPLER COMPLETE: CPT

## 2021-01-12 PROCEDURE — 93306 TTE W/DOPPLER COMPLETE: CPT | Performed by: SPECIALIST

## 2021-01-21 ENCOUNTER — TRANSCRIBE ORDERS (OUTPATIENT)
Dept: ADMINISTRATIVE | Facility: HOSPITAL | Age: 81
End: 2021-01-21

## 2021-01-21 DIAGNOSIS — R00.2 PALPITATIONS: Primary | ICD-10-CM

## 2021-01-27 ENCOUNTER — HOSPITAL ENCOUNTER (OUTPATIENT)
Dept: RESPIRATORY THERAPY | Facility: HOSPITAL | Age: 81
Discharge: HOME | End: 2021-01-27
Admitting: FAMILY MEDICINE

## 2021-01-27 DIAGNOSIS — R00.2 PALPITATIONS: ICD-10-CM

## 2021-01-27 PROCEDURE — 93246 EXT ECG>7D<15D RECORDING: CPT

## 2021-02-12 DIAGNOSIS — Z23 IMMUNIZATION DUE: ICD-10-CM

## 2021-02-22 PROCEDURE — 93248 EXT ECG>7D<15D REV&INTERPJ: CPT | Performed by: INTERNAL MEDICINE

## 2021-04-21 DIAGNOSIS — R00.2 PALPITATION: ICD-10-CM

## 2021-04-21 DIAGNOSIS — I05.9 MITRAL VALVE DISORDER: Primary | ICD-10-CM

## 2021-04-22 ENCOUNTER — CONSULT (OUTPATIENT)
Dept: CARDIOLOGY | Facility: CLINIC | Age: 81
End: 2021-04-22

## 2021-04-22 ENCOUNTER — HOSPITAL ENCOUNTER (OUTPATIENT)
Dept: GENERAL RADIOLOGY | Facility: HOSPITAL | Age: 81
Discharge: HOME | End: 2021-04-22
Admitting: INTERNAL MEDICINE

## 2021-04-22 VITALS
TEMPERATURE: 97.5 F | DIASTOLIC BLOOD PRESSURE: 62 MMHG | HEIGHT: 63 IN | WEIGHT: 177.25 LBS | BODY MASS INDEX: 31.41 KG/M2 | SYSTOLIC BLOOD PRESSURE: 119 MMHG | HEART RATE: 113 BPM

## 2021-04-22 DIAGNOSIS — I48.91 ATRIAL FIBRILLATION, UNSPECIFIED TYPE (HCC): Primary | ICD-10-CM

## 2021-04-22 DIAGNOSIS — I05.9 MITRAL VALVE DISORDER: ICD-10-CM

## 2021-04-22 DIAGNOSIS — I10 ESSENTIAL HYPERTENSION: ICD-10-CM

## 2021-04-22 DIAGNOSIS — R00.2 PALPITATION: ICD-10-CM

## 2021-04-22 DIAGNOSIS — E78.2 MIXED HYPERLIPIDEMIA: ICD-10-CM

## 2021-04-22 PROCEDURE — 99214 OFFICE O/P EST MOD 30 MIN: CPT | Performed by: INTERNAL MEDICINE

## 2021-04-22 PROCEDURE — 93000 ELECTROCARDIOGRAM COMPLETE: CPT | Performed by: PHYSICIAN ASSISTANT

## 2021-04-22 PROCEDURE — 71046 X-RAY EXAM CHEST 2 VIEWS: CPT

## 2021-04-22 RX ORDER — ALLOPURINOL 300 MG/1
300 TABLET ORAL AS NEEDED
COMMUNITY
Start: 2020-05-19

## 2021-04-22 RX ORDER — METOPROLOL TARTRATE 100 MG/1
100 TABLET ORAL 2 TIMES DAILY
Qty: 60 TABLET | Refills: 11 | Status: SHIPPED | OUTPATIENT
Start: 2021-04-22 | End: 2022-04-15 | Stop reason: SDUPTHER

## 2021-04-22 NOTE — PROGRESS NOTES
"  OFFICE FOLLOW UP     Date of Encounter:2021     Name: Elmo Martinez  : 1940  Address: [facility] [address] [place] [address]    PCP: Emmanuel Marcum MD  [address] [place] [address]    Elmo Martinez is a 80 y.o. female.    Chief Complaint: Follow up for palpitations/ PAF    Problem List:   1. Paroxysmal atrial fibrillation   1. 2 week monitor 2021: SR with occasional PACs with short runs of SVT vs possible Afib   2. Echo 2021: EF 66-70%, mild AS, moderate MR, mild MS, elevated RVSP  2. Chest pain  1. Echocardiogram with LVEF >65%, mild LVH 3/29/13  2. Normal regadenoson cardiolite stress with LVEF 76% 3/29/13  3. Echocardiogram LVEF 60%-65%, mod MR, mild to mod TR and evidence severe pulmonary HTN 16  4. Abnormal lexiscan nuclear stress 16  5. LHC, 2016:  Normal LV function, normal coronaries, normal pulmonary artery pressures.  6. Echo, 2018:  ... \"severe PA hypertension\" and mild MS\"  3. Hypertension  4. Dyslipidemia, intolerant to statins secondary to myalgias and elevated CPK   5. Diabetes Mellitus with neuropathy  6. Carotid artery disease  1. Carotid duplex 2020: LICA 50-69% stenosis, asymptomatic   7. GERD and hiatal hernia  8. Osteoarthritis  9. Skin cancer  10. Glaucoma  11. History of gout  12. Scoliosis  13. Small stomach ulcers  14. Surgical history: hysterectomy, glaucoma, appendectomy       Allergies:  Allergies   Allergen Reactions   • Codeine Mental Status Change   • Meperidine Mental Status Change   • Statins Myalgia     Zocor, lipitor, welchol and pravachol   • Sulfa Antibiotics Rash   • Other Swelling     Swelling at site of insertion   • Flu Virus Vaccine Swelling       Current Medications:  Current Outpatient Medications   Medication Instructions   • Alirocumab 75 mg, Subcutaneous, Every 14 Days   • allopurinol (ZYLOPRIM) 300 mg, Oral, As Needed   • ALPRAZolam (XANAX) 0.5 mg, Oral, 2 Times Daily PRN, Takes 0.25 prn   • amLODIPine (NORVASC) 10 mg, " "Oral, Daily   • apixaban (ELIQUIS) 5 mg, Oral, 2 Times Daily   • aspirin 81 mg, Oral, Daily   • brimonidine (ALPHAGAN) 0.2 % ophthalmic solution 1 drop, 2 Times Daily   • diclofenac (VOLTAREN) 75 mg, Oral, 2 Times Daily   • FIBER PO Oral, Daily   • gabapentin (NEURONTIN) 800 mg, Oral, 2 Times Daily   • HYDROcodone-acetaminophen (NORCO) 7.5-325 MG per tablet 1 tablet, Oral, Every 12 Hours PRN   • lisinopril (PRINIVIL,ZESTRIL) 20 mg, Oral, Daily   • meclizine (ANTIVERT) 25 mg, Oral, 3 Times Daily PRN   • metoprolol tartrate (LOPRESSOR) 100 mg, Oral, 2 Times Daily   • nitroglycerin (NITROSTAT) 0.4 mg, Sublingual, Every 5 Minutes PRN, Take no more than 3 doses in 15 minutes.    • pantoprazole (PROTONIX) 40 mg, Oral, Daily   • timolol (TIMOPTIC) 0.5 % ophthalmic solution 1 drop, Both Eyes, 2 Times Daily   • vitamin E 200 Units, Oral, Daily        History of Present Illness:           Mrs. Martinez presents for re-evaluation today for atrial fibrillation. She reports intermittent palpitations ongoing for at least 4 months that felt like a \"quivering\" sensation. She wore a 2 week monitor in January which showed brief Afib vs SVT runs, and an echo which showed normal LV function. Her Afib was later caught on an EKG in Dr. Marcum's office and she was started on Eliquis about a month ago. At the same time, her Diclofenac was discontinued and she reports since then she has had significant pain related to scoliosis and arthritis. She has not been able to sleep well due to the pain and has been sleeping in a recliner with her legs hanging down which has resulted in some dependent edema. She denies any syncope or pre-syncope, no orthopnea, or PND. She denies any chest pain. She as well had a recent carotid duplex which showed nonobstructive disease. She reports previous intolerance to multiple statins due to severe myalgias and elevated CPK level of 3800.     The following portions of the patient's history were reviewed and updated " "as appropriate: allergies, current medications and problem list.    ROS: Pertinent positives as listed in the HPI.  All other systems reviewed and negative.    Objective:  Vitals:    04/22/21 1222 04/22/21 1223 04/22/21 1224   BP: 136/74 156/87 119/62   BP Location: Right arm Right arm Left arm   Patient Position: Sitting Standing Sitting   Pulse: 85 113    Temp: 97.5 °F (36.4 °C)     Weight: 80.4 kg (177 lb 4 oz)     Height: 160 cm (63\")       Body mass index is 31.4 kg/m².    Physical Exam:  GENERAL: Alert, cooperative, in no acute distress.   HEENT: Normocephalic, no adenopathy, no jugular venous distention  HEART: No discrete PMI is noted. Irregular rhythm, fast rate, and no murmurs, gallops, or rubs.   LUNGS: Clear to auscultation bilaterally. No wheezing, rales or ronchi.  ABDOMEN: Soft, bowel sounds present, non-tender   NEUROLOGIC: No focal abnormalities involving strength or sensation are noted.   EXTREMITIES: No clubbing, cyanosis, 2+ edema noted.     Diagnostic Data:    Labs 3/17/21:  CBC: WBC 10.2, RBC 4.41, Hgb 11.8, Hct 36.5, Plt 288  CMP: Glucose 109, BUN 13, Cr 0.82, eGFR 68, Na 141, K 5.0, Cl0 105, CO2 22, Alk Phos 89, AST 19, ALT 16  C-RP 3  Lipid panel: , HDL 40, , Trig 258  TSH: 2.46      ECG 12 Lead    Date/Time: 4/22/2021 5:19 PM  Performed by: Criss Rebolledo PA-C  Authorized by: Criss Rebolledo PA-C   Comparison: compared with previous ECG from 8/7/2018  Comparison to previous ECG: Atrial fibrillation now   Rhythm: atrial fibrillation  Rate: tachycardic  BPM: 111  Other findings: non-specific ST-T wave changes    Clinical impression: abnormal EKG          Advance Care Planning   ACP discussion was held with the patient during this visit. Patient has an advance directive in EMR which is still valid.     Assessment and Plan:     1. Atrial fibrillation: she is mostly asymptomatic and reports her palpitations have largely improved. We will continue rate control and " anticoagulation strategy and will increase Metoprolol to 100mg BID to target HR <100bpm. This was discussed with her. She will as well continue Eliquis for anticoagulation.   2. Arthritis/scoliosis: she is in significant amount of pain and we told her she can resume her Diclofenac for arthritis even though there may be an increased risk of bleeding, and she understands.   3. Hypertension: slightly above target today, however she is in a lot of pain. She will continue to check home BPs and will follow up with her PCP in this regard.  4. Hyperlipidemia: recent lipid panel reviewed with . She has previously been intolerant to several statins due to myalgias and elevated CPK levels. We will start the process for a PCSK-9 inhibitor due to carotid artery plaque.   5. Follow up in 6 months, sooner if needed.     Scribed for Pancho Casey MD by Criss Rebolledo PA-C. 4/22/2021  13:49 EDT

## 2021-04-23 NOTE — TELEPHONE ENCOUNTER
Insurance prefers Repatha.    Pharmacy did not receive first Rx for Repatha - will send again 4/26/21

## 2021-05-15 ENCOUNTER — HOSPITAL ENCOUNTER (EMERGENCY)
Facility: HOSPITAL | Age: 81
Discharge: HOME | End: 2021-05-15
Attending: FAMILY MEDICINE | Admitting: FAMILY MEDICINE

## 2021-05-15 ENCOUNTER — APPOINTMENT (OUTPATIENT)
Dept: GENERAL RADIOLOGY | Facility: HOSPITAL | Age: 81
End: 2021-05-15

## 2021-05-15 VITALS
OXYGEN SATURATION: 99 % | TEMPERATURE: 97.8 F | WEIGHT: 178 LBS | RESPIRATION RATE: 16 BRPM | SYSTOLIC BLOOD PRESSURE: 124 MMHG | HEIGHT: 62 IN | HEART RATE: 71 BPM | BODY MASS INDEX: 32.76 KG/M2 | DIASTOLIC BLOOD PRESSURE: 60 MMHG

## 2021-05-15 DIAGNOSIS — U07.1 COVID-19: Primary | ICD-10-CM

## 2021-05-15 LAB
ALBUMIN SERPL-MCNC: 4.46 G/DL (ref 3.5–5.2)
ALBUMIN/GLOB SERPL: 1.6 G/DL
ALP SERPL-CCNC: 89 U/L (ref 39–117)
ALT SERPL W P-5'-P-CCNC: 12 U/L (ref 1–33)
ANION GAP SERPL CALCULATED.3IONS-SCNC: 10.5 MMOL/L (ref 5–15)
AST SERPL-CCNC: 18 U/L (ref 1–32)
BASOPHILS # BLD AUTO: 0.08 10*3/MM3 (ref 0–0.2)
BASOPHILS NFR BLD AUTO: 1.1 % (ref 0–1.5)
BILIRUB SERPL-MCNC: 0.5 MG/DL (ref 0–1.2)
BILIRUB UR QL STRIP: NEGATIVE
BUN SERPL-MCNC: 6 MG/DL (ref 8–23)
BUN/CREAT SERPL: 8.7 (ref 7–25)
CALCIUM SPEC-SCNC: 9.7 MG/DL (ref 8.6–10.5)
CHLORIDE SERPL-SCNC: 99 MMOL/L (ref 98–107)
CLARITY UR: CLEAR
CO2 SERPL-SCNC: 27.5 MMOL/L (ref 22–29)
COLOR UR: YELLOW
CREAT SERPL-MCNC: 0.69 MG/DL (ref 0.57–1)
CRP SERPL-MCNC: 1.18 MG/DL (ref 0–0.5)
D-LACTATE SERPL-SCNC: 1.5 MMOL/L (ref 0.5–2)
DEPRECATED RDW RBC AUTO: 42.3 FL (ref 37–54)
EOSINOPHIL # BLD AUTO: 0.05 10*3/MM3 (ref 0–0.4)
EOSINOPHIL NFR BLD AUTO: 0.7 % (ref 0.3–6.2)
ERYTHROCYTE [DISTWIDTH] IN BLOOD BY AUTOMATED COUNT: 13.8 % (ref 12.3–15.4)
FERRITIN SERPL-MCNC: 23.4 NG/ML (ref 13–150)
FLUAV RNA RESP QL NAA+PROBE: NOT DETECTED
FLUBV RNA RESP QL NAA+PROBE: NOT DETECTED
GFR SERPL CREATININE-BSD FRML MDRD: 82 ML/MIN/1.73
GLOBULIN UR ELPH-MCNC: 2.8 GM/DL
GLUCOSE SERPL-MCNC: 122 MG/DL (ref 65–99)
GLUCOSE UR STRIP-MCNC: NEGATIVE MG/DL
HCT VFR BLD AUTO: 39.6 % (ref 34–46.6)
HGB BLD-MCNC: 11.6 G/DL (ref 12–15.9)
HGB UR QL STRIP.AUTO: NEGATIVE
IMM GRANULOCYTES # BLD AUTO: 0.04 10*3/MM3 (ref 0–0.05)
IMM GRANULOCYTES NFR BLD AUTO: 0.6 % (ref 0–0.5)
KETONES UR QL STRIP: NEGATIVE
LDH SERPL-CCNC: 214 U/L (ref 135–214)
LEUKOCYTE ESTERASE UR QL STRIP.AUTO: NEGATIVE
LYMPHOCYTES # BLD AUTO: 0.7 10*3/MM3 (ref 0.7–3.1)
LYMPHOCYTES NFR BLD AUTO: 9.7 % (ref 19.6–45.3)
MCH RBC QN AUTO: 24.4 PG (ref 26.6–33)
MCHC RBC AUTO-ENTMCNC: 29.3 G/DL (ref 31.5–35.7)
MCV RBC AUTO: 83.2 FL (ref 79–97)
MONOCYTES # BLD AUTO: 1.35 10*3/MM3 (ref 0.1–0.9)
MONOCYTES NFR BLD AUTO: 18.8 % (ref 5–12)
NEUTROPHILS NFR BLD AUTO: 4.97 10*3/MM3 (ref 1.7–7)
NEUTROPHILS NFR BLD AUTO: 69.1 % (ref 42.7–76)
NITRITE UR QL STRIP: NEGATIVE
NRBC BLD AUTO-RTO: 0 /100 WBC (ref 0–0.2)
PH UR STRIP.AUTO: 7 [PH] (ref 5–8)
PLATELET # BLD AUTO: 199 10*3/MM3 (ref 140–450)
PMV BLD AUTO: 12.3 FL (ref 6–12)
POTASSIUM SERPL-SCNC: 4.1 MMOL/L (ref 3.5–5.2)
PROCALCITONIN SERPL-MCNC: 0.09 NG/ML (ref 0–0.25)
PROT SERPL-MCNC: 7.3 G/DL (ref 6–8.5)
PROT UR QL STRIP: NEGATIVE
RBC # BLD AUTO: 4.76 10*6/MM3 (ref 3.77–5.28)
SARS-COV-2 RNA RESP QL NAA+PROBE: DETECTED
SODIUM SERPL-SCNC: 137 MMOL/L (ref 136–145)
SP GR UR STRIP: <=1.005 (ref 1–1.03)
TROPONIN T SERPL-MCNC: <0.01 NG/ML (ref 0–0.03)
UROBILINOGEN UR QL STRIP: NORMAL
WBC # BLD AUTO: 7.19 10*3/MM3 (ref 3.4–10.8)

## 2021-05-15 PROCEDURE — 84484 ASSAY OF TROPONIN QUANT: CPT | Performed by: FAMILY MEDICINE

## 2021-05-15 PROCEDURE — 83605 ASSAY OF LACTIC ACID: CPT | Performed by: FAMILY MEDICINE

## 2021-05-15 PROCEDURE — 85025 COMPLETE CBC W/AUTO DIFF WBC: CPT | Performed by: FAMILY MEDICINE

## 2021-05-15 PROCEDURE — 82728 ASSAY OF FERRITIN: CPT | Performed by: FAMILY MEDICINE

## 2021-05-15 PROCEDURE — 87636 SARSCOV2 & INF A&B AMP PRB: CPT | Performed by: FAMILY MEDICINE

## 2021-05-15 PROCEDURE — 84145 PROCALCITONIN (PCT): CPT | Performed by: FAMILY MEDICINE

## 2021-05-15 PROCEDURE — 71045 X-RAY EXAM CHEST 1 VIEW: CPT

## 2021-05-15 PROCEDURE — 86140 C-REACTIVE PROTEIN: CPT | Performed by: FAMILY MEDICINE

## 2021-05-15 PROCEDURE — M0245 HC INTRAVENOUS INFUSION, BAMLANIVIMAB AND ETESEVIMAB, 2100 MG: HCPCS | Performed by: FAMILY MEDICINE

## 2021-05-15 PROCEDURE — 36415 COLL VENOUS BLD VENIPUNCTURE: CPT

## 2021-05-15 PROCEDURE — 81003 URINALYSIS AUTO W/O SCOPE: CPT | Performed by: FAMILY MEDICINE

## 2021-05-15 PROCEDURE — 25010000006 INJECTION, BAMLANIVIMAB AND ETESEVIMAB, 2100 MG: Performed by: FAMILY MEDICINE

## 2021-05-15 PROCEDURE — 87040 BLOOD CULTURE FOR BACTERIA: CPT | Performed by: FAMILY MEDICINE

## 2021-05-15 PROCEDURE — 99284 EMERGENCY DEPT VISIT MOD MDM: CPT

## 2021-05-15 PROCEDURE — 83615 LACTATE (LD) (LDH) ENZYME: CPT | Performed by: FAMILY MEDICINE

## 2021-05-15 PROCEDURE — 80053 COMPREHEN METABOLIC PANEL: CPT | Performed by: FAMILY MEDICINE

## 2021-05-15 RX ORDER — SODIUM CHLORIDE 0.9 % (FLUSH) 0.9 %
10 SYRINGE (ML) INJECTION AS NEEDED
Status: DISCONTINUED | OUTPATIENT
Start: 2021-05-15 | End: 2021-05-15 | Stop reason: HOSPADM

## 2021-05-15 RX ORDER — DIPHENHYDRAMINE HYDROCHLORIDE 50 MG/ML
50 INJECTION INTRAMUSCULAR; INTRAVENOUS ONCE AS NEEDED
Status: DISCONTINUED | OUTPATIENT
Start: 2021-05-15 | End: 2021-05-15 | Stop reason: HOSPADM

## 2021-05-15 RX ORDER — EPINEPHRINE 1 MG/ML
0.3 INJECTION, SOLUTION INTRAMUSCULAR; SUBCUTANEOUS ONCE AS NEEDED
Status: DISCONTINUED | OUTPATIENT
Start: 2021-05-15 | End: 2021-05-15 | Stop reason: HOSPADM

## 2021-05-15 RX ORDER — SODIUM CHLORIDE 9 MG/ML
30 INJECTION, SOLUTION INTRAVENOUS ONCE
Status: COMPLETED | OUTPATIENT
Start: 2021-05-15 | End: 2021-05-15

## 2021-05-15 RX ORDER — METHYLPREDNISOLONE SODIUM SUCCINATE 125 MG/2ML
125 INJECTION, POWDER, LYOPHILIZED, FOR SOLUTION INTRAMUSCULAR; INTRAVENOUS ONCE AS NEEDED
Status: DISCONTINUED | OUTPATIENT
Start: 2021-05-15 | End: 2021-05-15 | Stop reason: HOSPADM

## 2021-05-15 RX ADMIN — SODIUM CHLORIDE 30 ML: 9 INJECTION, SOLUTION INTRAVENOUS at 08:33

## 2021-05-15 RX ADMIN — SODIUM CHLORIDE: 9 INJECTION, SOLUTION INTRAVENOUS at 08:01

## 2021-05-17 ENCOUNTER — PATIENT OUTREACH (OUTPATIENT)
Dept: CASE MANAGEMENT | Facility: OTHER | Age: 81
End: 2021-05-17

## 2021-05-17 NOTE — OUTREACH NOTE
ED Potential Covid Discharge Follow-up    Patient was seen in the ED 5/15/21 and tested positive for Covid-19. Patient reports feeling OK today, states she has been coughing a lot; this has been her primary symptom. RN-ACM discussed OTC options for cough control at night; pt v/u, states she usually takes Coricidin HBP. ACM discussed symptom mgmt and pneumonia prevention, pt voiced understanding; pt reports she drinks several bottles of water a day as a habit. AVS instructions reviewed including 24/7 nurse line and Covid Hotline numbers, pt voiced understanding.     Andreia Washington RN  Ambulatory     5/17/2021, 12:08 EDT

## 2021-05-20 LAB
BACTERIA SPEC AEROBE CULT: NORMAL
BACTERIA SPEC AEROBE CULT: NORMAL

## 2021-07-01 ENCOUNTER — TRANSCRIBE ORDERS (OUTPATIENT)
Dept: ADMINISTRATIVE | Facility: HOSPITAL | Age: 81
End: 2021-07-01

## 2021-07-01 DIAGNOSIS — G89.29 CHRONIC PAIN OF RIGHT KNEE: Primary | ICD-10-CM

## 2021-07-01 DIAGNOSIS — M25.561 CHRONIC PAIN OF RIGHT KNEE: Primary | ICD-10-CM

## 2021-07-13 ENCOUNTER — HOSPITAL ENCOUNTER (OUTPATIENT)
Dept: MRI IMAGING | Facility: HOSPITAL | Age: 81
Discharge: HOME | End: 2021-07-13

## 2021-07-13 DIAGNOSIS — G89.29 CHRONIC PAIN OF RIGHT KNEE: ICD-10-CM

## 2021-07-13 DIAGNOSIS — M25.561 CHRONIC PAIN OF RIGHT KNEE: ICD-10-CM

## 2021-07-13 PROCEDURE — 73721 MRI JNT OF LWR EXTRE W/O DYE: CPT | Performed by: RADIOLOGY

## 2021-07-13 PROCEDURE — 73721 MRI JNT OF LWR EXTRE W/O DYE: CPT

## 2021-11-22 ENCOUNTER — DOCUMENTATION (OUTPATIENT)
Dept: CARDIOLOGY | Facility: CLINIC | Age: 81
End: 2021-11-22

## 2021-12-13 NOTE — PROGRESS NOTES
"[facility]    Encounter Date: 2021    Patient ID: Elmo Martinez is a 81 y.o. female.  : 1940     PCP: Herman Bob PA       Chief Complaint: Atrial Fibrillation      PROBLEM LIST:  1. Paroxysmal atrial fibrillation  a. 2 week monitor 2021: SR with occasional PACs with short runs of SVT vs possible Afib   b. Echo 2021: EF 66-70%, mild AS, moderate MR, mild MS, elevated RVSP  2. Chest pain  a. Echocardiogram with LVEF >65%, mild LVH 3/29/13  b. Normal regadenoson cardiolite stress with LVEF 76% 3/29/13  c. Echocardiogram LVEF 60%-65%, mod MR, mild to mod TR and evidence severe pulmonary HTN 16  d. Abnormal lexiscan nuclear stress 16  e. LHC, 2016:  Normal LV function, normal coronaries, normal pulmonary artery pressures.  f. Echo, 2018: \"severe PA hypertension\" and mild MS\"  3. Hypertension  4. Dyslipidemia, intolerant to statins secondary to myalgias and elevated CPK   5. Diabetes Mellitus with neuropathy  6. Carotid artery disease  a. Carotid duplex 2020: LICA 50-69% stenosis, asymptomatic   7. GERD and hiatal hernia  8. Osteoarthritis  9. Skin cancer  10. Glaucoma  11. History of gout  12. Scoliosis  13. Small stomach ulcers  14. Surgical history: hysterectomy, glaucoma, appendectomy    History of Present Illness  Patient presents today for a 6 month follow-up with a history of atrial fibrillation and cardiac risk factors. Since last visit, sometimes she has chest discomfort when standing or walking. When her scoliosis causes spasms the pain radiates to upper chest and neck. She is physically limited by back pain and scoliosis. Patient denies shortness of breath, palpitations, edema, dizziness, and syncope.     Allergies   Allergen Reactions   • Codeine Mental Status Change   • Meperidine Mental Status Change   • Statins Myalgia     Zocor, lipitor, welchol and pravachol   • Sulfa Antibiotics Rash   • Other Swelling     " Swelling at site of insertion   • Flu Virus Vaccine Swelling         Current Outpatient Medications:   •  allopurinol (ZYLOPRIM) 300 MG tablet, Take 300 mg by mouth As Needed., Disp: , Rfl:   •  ALPRAZolam (XANAX) 0.5 MG tablet, Take 0.5 mg by mouth 2 (two) times a day as needed. Takes 0.25 prn, Disp: , Rfl:   •  apixaban (ELIQUIS) 5 MG tablet tablet, Take 1 tablet by mouth Every 12 (Twelve) Hours., Disp: 180 tablet, Rfl: 3  •  aspirin 81 MG tablet, Take 81 mg by mouth daily., Disp: , Rfl:   •  brimonidine (ALPHAGAN) 0.2 % ophthalmic solution, 1 drop 2 (Two) Times a Day., Disp: , Rfl:   •  Evolocumab (REPATHA) solution auto-injector SureClick injection, Inject 1 mL under the skin into the appropriate area as directed Every 14 (Fourteen) Days., Disp: 2 pen, Rfl: 11  •  gabapentin (NEURONTIN) 800 MG tablet, Take 800 mg by mouth 2 (two) times a day., Disp: , Rfl:   •  lisinopril (PRINIVIL,ZESTRIL) 20 MG tablet, Take 20 mg by mouth Daily., Disp: , Rfl:   •  meclizine (ANTIVERT) 25 MG tablet, Take 25 mg by mouth 3 (Three) Times a Day As Needed for dizziness., Disp: , Rfl:   •  metoprolol tartrate (LOPRESSOR) 100 MG tablet, Take 1 tablet by mouth 2 (Two) Times a Day., Disp: 60 tablet, Rfl: 11  •  nitroglycerin (NITROSTAT) 0.4 MG SL tablet, Place 0.4 mg under the tongue Every 5 (Five) Minutes As Needed for Chest Pain. Take no more than 3 doses in 15 minutes., Disp: , Rfl:   •  pantoprazole (PROTONIX) 40 MG EC tablet, Take 40 mg by mouth Daily., Disp: , Rfl:   •  timolol (TIMOPTIC) 0.5 % ophthalmic solution, Administer 1 drop to both eyes 2 (Two) Times a Day., Disp: , Rfl:   •  diclofenac (VOLTAREN) 75 MG EC tablet, Take 75 mg by mouth 2 (Two) Times a Day., Disp: , Rfl:   •  dilTIAZem CD (CARDIZEM CD) 240 MG 24 hr capsule, Take 1 capsule by mouth Daily., Disp: 90 capsule, Rfl: 3  •  FIBER PO, Take  by mouth Daily., Disp: , Rfl:   •  HYDROcodone-acetaminophen (NORCO) 7.5-325 MG per tablet, Take 1 tablet by mouth every 12  "(twelve) hours as needed for moderate pain (4-6) (right shoulder pain)., Disp: , Rfl:     The following portions of the patient's history were reviewed and updated as appropriate: allergies, current medications, past family history, past medical history, past social history, past surgical history and problem list.    ROS  Review of Systems   Constitution: Negative for chills, fever, fatigue, generalized weakness.   Cardiovascular: Negative for dyspnea on exertion, leg swelling, palpitations, orthopnea, and syncope. Positive for chest discomfort   Respiratory: Negative for cough, shortness of breath, and wheezing.  HENT: Negative for ear pain, nosebleeds, and tinnitus.  Gastrointestinal: Negative for abdominal pain, constipation, diarrhea, nausea and vomiting.   Genitourinary: No urinary symptoms.  Musculoskeletal: Negative for muscle cramps. Positive for back pain.   Neurological: Negative for dizziness, headaches, loss of balance, numbness, and symptoms of stroke.  Psychiatric: Normal mental status.     All other systems reviewed and are negative.        Objective:     /82 (BP Location: Left arm, Patient Position: Sitting)   Pulse 74   Ht 157.5 cm (62\")   Wt 81.2 kg (179 lb)   LMP  (LMP Unknown)   SpO2 96%   BMI 32.74 kg/m²   BP repeat: 132/80     Physical Exam  Constitutional: Patient appears well-developed and well-nourished.   HENT: HEENT exam unremarkable.   Neck: Neck supple. No JVD present. No carotid bruits.   Cardiovascular: Normal rate, regular rhythm and normal heart sounds. No murmur heard.   2+ symmetric pulses.   Pulmonary/Chest: Breath sounds normal. Does not exhibit tenderness.   Abdominal: Abdomen benign.   Musculoskeletal: Does not exhibit edema.   Neurological: Neurological exam unremarkable.   Vitals reviewed.    Data Review:     Lab date: 8/24/2021  • FLP: TC 91, , HDL 58, LDL 9  • CMP: Glu 105, BUN 8, Creat 18, eGFR 8, Na 87, K 4.8, Cl 104, CO2 24, Ca 9.5, Alk Phos 87, AST 18, " ALT 8  • CBC: WBC 8.9, RBC 4.93, HGB 11.7, HCT 39.3, MCV 80, MCH 23.7,   • HbA1c: 6.6      Procedures       Assessment:      Diagnosis Plan   1. Atrial fibrillation, unspecified type (HCC)  Maintaining SR but elevated heart rate; begin diltiazem 240 mg daily and continue Eliquis 5 mg    2. Chest pain with negative cardiac cath, suspect spastic angina.  Begin taking nitroglycerin as needed and if it improves pain we can start long acting NTG.    3. Essential hypertension  Sub-optimal control; d/c amlodipine and replace with diltiazem 240 mg daily to control both HR and BP   4. Mixed hyperlipidemia  Well controlled; continue Repatha      Plan:   Discontinue amlodipine and begin diltiazem for improve control of blood pressure and heart rate as well as angina..   Take nitroglycerin for chest discomfort and if it improves we can start long acting nitroglycerin.   Continue current medications.   FU in 6 MO, sooner as needed.  Thank you for allowing us to participate in the care of your patient.     Scribed for Bernice Amezquita MD by Dominga Whatley. 12/14/2021 12:18 EST      I, Bernice Amezquita MD, personally performed the services described in this documentation as scribed by the above named individual in my presence, and it is both accurate and complete.  12/16/2021  09:00 EST        Part of this note may be an electronic transcription/translation of spoken language to printed text using the Dragon Dictation System.

## 2021-12-14 ENCOUNTER — OFFICE VISIT (OUTPATIENT)
Dept: CARDIOLOGY | Facility: CLINIC | Age: 81
End: 2021-12-14

## 2021-12-14 VITALS
BODY MASS INDEX: 32.94 KG/M2 | OXYGEN SATURATION: 96 % | DIASTOLIC BLOOD PRESSURE: 82 MMHG | WEIGHT: 179 LBS | HEIGHT: 62 IN | SYSTOLIC BLOOD PRESSURE: 145 MMHG | HEART RATE: 74 BPM

## 2021-12-14 DIAGNOSIS — I10 ESSENTIAL HYPERTENSION: ICD-10-CM

## 2021-12-14 DIAGNOSIS — R07.9 CHEST PAIN, UNSPECIFIED TYPE: ICD-10-CM

## 2021-12-14 DIAGNOSIS — E78.2 MIXED HYPERLIPIDEMIA: ICD-10-CM

## 2021-12-14 DIAGNOSIS — I48.91 ATRIAL FIBRILLATION, UNSPECIFIED TYPE (HCC): Primary | ICD-10-CM

## 2021-12-14 PROCEDURE — 99214 OFFICE O/P EST MOD 30 MIN: CPT | Performed by: INTERNAL MEDICINE

## 2021-12-14 RX ORDER — DILTIAZEM HYDROCHLORIDE 240 MG/1
240 CAPSULE, COATED, EXTENDED RELEASE ORAL DAILY
Qty: 90 CAPSULE | Refills: 3 | Status: SHIPPED | OUTPATIENT
Start: 2021-12-14 | End: 2022-12-13 | Stop reason: SDUPTHER

## 2022-01-12 ENCOUNTER — TELEPHONE (OUTPATIENT)
Dept: CARDIOLOGY | Facility: CLINIC | Age: 82
End: 2022-01-12

## 2022-01-12 NOTE — TELEPHONE ENCOUNTER
Patient called to report that since stopping Amlodipine and beginning diltiazem, her BP has been averaging 150/75 HR 77.  Patient states that her chest/back tightness has improved with SL Nitro.     Please advise on BP. Thanks!

## 2022-01-13 RX ORDER — ISOSORBIDE MONONITRATE 60 MG/1
60 TABLET, EXTENDED RELEASE ORAL EVERY MORNING
Qty: 30 TABLET | Refills: 11 | Status: SHIPPED | OUTPATIENT
Start: 2022-01-13 | End: 2022-07-12 | Stop reason: SINTOL

## 2022-01-13 NOTE — TELEPHONE ENCOUNTER
Patient notified of recommendations. Patient is agreeable to plan, all questions answered at this time.

## 2022-04-15 RX ORDER — METOPROLOL TARTRATE 100 MG/1
100 TABLET ORAL 2 TIMES DAILY
Qty: 180 TABLET | Refills: 3 | Status: SHIPPED | OUTPATIENT
Start: 2022-04-15 | End: 2023-01-03 | Stop reason: SDUPTHER

## 2022-05-07 ENCOUNTER — APPOINTMENT (OUTPATIENT)
Dept: GENERAL RADIOLOGY | Facility: HOSPITAL | Age: 82
End: 2022-05-07

## 2022-05-07 PROCEDURE — 85025 COMPLETE CBC W/AUTO DIFF WBC: CPT | Performed by: EMERGENCY MEDICINE

## 2022-05-07 PROCEDURE — 83605 ASSAY OF LACTIC ACID: CPT | Performed by: EMERGENCY MEDICINE

## 2022-05-07 PROCEDURE — 85652 RBC SED RATE AUTOMATED: CPT | Performed by: EMERGENCY MEDICINE

## 2022-05-07 PROCEDURE — 82550 ASSAY OF CK (CPK): CPT | Performed by: EMERGENCY MEDICINE

## 2022-05-07 PROCEDURE — 83735 ASSAY OF MAGNESIUM: CPT | Performed by: EMERGENCY MEDICINE

## 2022-05-07 PROCEDURE — 80053 COMPREHEN METABOLIC PANEL: CPT | Performed by: EMERGENCY MEDICINE

## 2022-05-07 PROCEDURE — 86140 C-REACTIVE PROTEIN: CPT | Performed by: EMERGENCY MEDICINE

## 2022-05-07 PROCEDURE — 84443 ASSAY THYROID STIM HORMONE: CPT | Performed by: EMERGENCY MEDICINE

## 2022-05-07 PROCEDURE — 99284 EMERGENCY DEPT VISIT MOD MDM: CPT

## 2022-05-07 PROCEDURE — 36415 COLL VENOUS BLD VENIPUNCTURE: CPT

## 2022-05-07 PROCEDURE — 87040 BLOOD CULTURE FOR BACTERIA: CPT | Performed by: EMERGENCY MEDICINE

## 2022-05-07 PROCEDURE — 84484 ASSAY OF TROPONIN QUANT: CPT | Performed by: NURSE PRACTITIONER

## 2022-05-07 PROCEDURE — 73502 X-RAY EXAM HIP UNI 2-3 VIEWS: CPT

## 2022-05-07 RX ORDER — SODIUM CHLORIDE 0.9 % (FLUSH) 0.9 %
10 SYRINGE (ML) INJECTION AS NEEDED
Status: DISCONTINUED | OUTPATIENT
Start: 2022-05-07 | End: 2022-05-08 | Stop reason: HOSPADM

## 2022-05-08 ENCOUNTER — APPOINTMENT (OUTPATIENT)
Dept: CT IMAGING | Facility: HOSPITAL | Age: 82
End: 2022-05-08

## 2022-05-08 ENCOUNTER — HOSPITAL ENCOUNTER (EMERGENCY)
Facility: HOSPITAL | Age: 82
Discharge: HOME | End: 2022-05-08
Attending: EMERGENCY MEDICINE | Admitting: EMERGENCY MEDICINE

## 2022-05-08 VITALS
WEIGHT: 178 LBS | OXYGEN SATURATION: 94 % | HEIGHT: 62 IN | TEMPERATURE: 98.2 F | DIASTOLIC BLOOD PRESSURE: 116 MMHG | SYSTOLIC BLOOD PRESSURE: 149 MMHG | BODY MASS INDEX: 32.76 KG/M2 | RESPIRATION RATE: 18 BRPM | HEART RATE: 96 BPM

## 2022-05-08 DIAGNOSIS — M25.551 RIGHT HIP PAIN: ICD-10-CM

## 2022-05-08 DIAGNOSIS — K59.00 CONSTIPATION, UNSPECIFIED CONSTIPATION TYPE: ICD-10-CM

## 2022-05-08 DIAGNOSIS — I10 PRIMARY HYPERTENSION: Primary | ICD-10-CM

## 2022-05-08 LAB
ALBUMIN SERPL-MCNC: 4.7 G/DL (ref 3.5–5.2)
ALBUMIN/GLOB SERPL: 1.5 G/DL
ALP SERPL-CCNC: 107 U/L (ref 39–117)
ALT SERPL W P-5'-P-CCNC: 11 U/L (ref 1–33)
ANION GAP SERPL CALCULATED.3IONS-SCNC: 14.3 MMOL/L (ref 5–15)
AST SERPL-CCNC: 15 U/L (ref 1–32)
BASOPHILS # BLD AUTO: 0.17 10*3/MM3 (ref 0–0.2)
BASOPHILS NFR BLD AUTO: 1.2 % (ref 0–1.5)
BILIRUB SERPL-MCNC: 0.5 MG/DL (ref 0–1.2)
BILIRUB UR QL STRIP: NEGATIVE
BUN SERPL-MCNC: 12 MG/DL (ref 8–23)
BUN/CREAT SERPL: 14.3 (ref 7–25)
CALCIUM SPEC-SCNC: 9.3 MG/DL (ref 8.6–10.5)
CHLORIDE SERPL-SCNC: 103 MMOL/L (ref 98–107)
CK SERPL-CCNC: 73 U/L (ref 20–180)
CLARITY UR: CLEAR
CO2 SERPL-SCNC: 23.7 MMOL/L (ref 22–29)
COLOR UR: YELLOW
CREAT SERPL-MCNC: 0.84 MG/DL (ref 0.57–1)
CRP SERPL-MCNC: <0.3 MG/DL (ref 0–0.5)
D-LACTATE SERPL-SCNC: 1.3 MMOL/L (ref 0.5–2)
DEPRECATED RDW RBC AUTO: 45.8 FL (ref 37–54)
EGFRCR SERPLBLD CKD-EPI 2021: 70.4 ML/MIN/1.73
EOSINOPHIL # BLD AUTO: 0.13 10*3/MM3 (ref 0–0.4)
EOSINOPHIL NFR BLD AUTO: 0.9 % (ref 0.3–6.2)
ERYTHROCYTE [DISTWIDTH] IN BLOOD BY AUTOMATED COUNT: 15.7 % (ref 12.3–15.4)
ERYTHROCYTE [SEDIMENTATION RATE] IN BLOOD: 9 MM/HR (ref 0–30)
GLOBULIN UR ELPH-MCNC: 3.1 GM/DL
GLUCOSE SERPL-MCNC: 113 MG/DL (ref 65–99)
GLUCOSE UR STRIP-MCNC: NEGATIVE MG/DL
HCT VFR BLD AUTO: 42.4 % (ref 34–46.6)
HGB BLD-MCNC: 12.7 G/DL (ref 12–15.9)
HGB UR QL STRIP.AUTO: NEGATIVE
HOLD SPECIMEN: NORMAL
HOLD SPECIMEN: NORMAL
IMM GRANULOCYTES # BLD AUTO: 0.07 10*3/MM3 (ref 0–0.05)
IMM GRANULOCYTES NFR BLD AUTO: 0.5 % (ref 0–0.5)
KETONES UR QL STRIP: NEGATIVE
LEUKOCYTE ESTERASE UR QL STRIP.AUTO: NEGATIVE
LYMPHOCYTES # BLD AUTO: 2.2 10*3/MM3 (ref 0.7–3.1)
LYMPHOCYTES NFR BLD AUTO: 15.5 % (ref 19.6–45.3)
MAGNESIUM SERPL-MCNC: 2.3 MG/DL (ref 1.6–2.4)
MCH RBC QN AUTO: 24.3 PG (ref 26.6–33)
MCHC RBC AUTO-ENTMCNC: 30 G/DL (ref 31.5–35.7)
MCV RBC AUTO: 81.2 FL (ref 79–97)
MONOCYTES # BLD AUTO: 1.55 10*3/MM3 (ref 0.1–0.9)
MONOCYTES NFR BLD AUTO: 10.9 % (ref 5–12)
NEUTROPHILS NFR BLD AUTO: 10.1 10*3/MM3 (ref 1.7–7)
NEUTROPHILS NFR BLD AUTO: 71 % (ref 42.7–76)
NITRITE UR QL STRIP: NEGATIVE
NRBC BLD AUTO-RTO: 0 /100 WBC (ref 0–0.2)
PH UR STRIP.AUTO: 7 [PH] (ref 5–8)
PLATELET # BLD AUTO: 302 10*3/MM3 (ref 140–450)
PMV BLD AUTO: 11.8 FL (ref 6–12)
POTASSIUM SERPL-SCNC: 4.1 MMOL/L (ref 3.5–5.2)
PROT SERPL-MCNC: 7.8 G/DL (ref 6–8.5)
PROT UR QL STRIP: NEGATIVE
QT INTERVAL: 368 MS
QTC INTERVAL: 442 MS
RBC # BLD AUTO: 5.22 10*6/MM3 (ref 3.77–5.28)
SODIUM SERPL-SCNC: 141 MMOL/L (ref 136–145)
SP GR UR STRIP: 1.01 (ref 1–1.03)
TROPONIN T SERPL-MCNC: <0.01 NG/ML (ref 0–0.03)
TROPONIN T SERPL-MCNC: <0.01 NG/ML (ref 0–0.03)
TSH SERPL DL<=0.05 MIU/L-ACNC: 4.25 UIU/ML (ref 0.27–4.2)
UROBILINOGEN UR QL STRIP: NORMAL
WBC NRBC COR # BLD: 14.22 10*3/MM3 (ref 3.4–10.8)
WHOLE BLOOD HOLD SPECIMEN: NORMAL
WHOLE BLOOD HOLD SPECIMEN: NORMAL

## 2022-05-08 PROCEDURE — 25010000002 ORPHENADRINE CITRATE PER 60 MG: Performed by: NURSE PRACTITIONER

## 2022-05-08 PROCEDURE — 93010 ELECTROCARDIOGRAM REPORT: CPT | Performed by: INTERNAL MEDICINE

## 2022-05-08 PROCEDURE — 96374 THER/PROPH/DIAG INJ IV PUSH: CPT

## 2022-05-08 PROCEDURE — 96375 TX/PRO/DX INJ NEW DRUG ADDON: CPT

## 2022-05-08 PROCEDURE — 25010000002 DIPHENHYDRAMINE PER 50 MG: Performed by: NURSE PRACTITIONER

## 2022-05-08 PROCEDURE — 25010000002 DEXAMETHASONE PER 1 MG: Performed by: EMERGENCY MEDICINE

## 2022-05-08 PROCEDURE — 84484 ASSAY OF TROPONIN QUANT: CPT | Performed by: NURSE PRACTITIONER

## 2022-05-08 PROCEDURE — 70450 CT HEAD/BRAIN W/O DYE: CPT

## 2022-05-08 PROCEDURE — 25010000002 KETOROLAC TROMETHAMINE PER 15 MG: Performed by: NURSE PRACTITIONER

## 2022-05-08 PROCEDURE — 74176 CT ABD & PELVIS W/O CONTRAST: CPT

## 2022-05-08 PROCEDURE — 25010000002 ONDANSETRON PER 1 MG: Performed by: NURSE PRACTITIONER

## 2022-05-08 PROCEDURE — 81003 URINALYSIS AUTO W/O SCOPE: CPT | Performed by: NURSE PRACTITIONER

## 2022-05-08 PROCEDURE — 93005 ELECTROCARDIOGRAM TRACING: CPT | Performed by: NURSE PRACTITIONER

## 2022-05-08 RX ORDER — DEXAMETHASONE SODIUM PHOSPHATE 4 MG/ML
10 INJECTION, SOLUTION INTRA-ARTICULAR; INTRALESIONAL; INTRAMUSCULAR; INTRAVENOUS; SOFT TISSUE ONCE
Status: COMPLETED | OUTPATIENT
Start: 2022-05-08 | End: 2022-05-08

## 2022-05-08 RX ORDER — ONDANSETRON 2 MG/ML
4 INJECTION INTRAMUSCULAR; INTRAVENOUS ONCE
Status: COMPLETED | OUTPATIENT
Start: 2022-05-08 | End: 2022-05-08

## 2022-05-08 RX ORDER — KETOROLAC TROMETHAMINE 30 MG/ML
15 INJECTION, SOLUTION INTRAMUSCULAR; INTRAVENOUS ONCE
Status: COMPLETED | OUTPATIENT
Start: 2022-05-08 | End: 2022-05-08

## 2022-05-08 RX ORDER — SODIUM CHLORIDE 0.9 % (FLUSH) 0.9 %
10 SYRINGE (ML) INJECTION AS NEEDED
Status: DISCONTINUED | OUTPATIENT
Start: 2022-05-08 | End: 2022-05-08 | Stop reason: HOSPADM

## 2022-05-08 RX ORDER — DIPHENHYDRAMINE HYDROCHLORIDE 50 MG/ML
25 INJECTION INTRAMUSCULAR; INTRAVENOUS ONCE
Status: COMPLETED | OUTPATIENT
Start: 2022-05-08 | End: 2022-05-08

## 2022-05-08 RX ORDER — ORPHENADRINE CITRATE 30 MG/ML
60 INJECTION INTRAMUSCULAR; INTRAVENOUS ONCE
Status: COMPLETED | OUTPATIENT
Start: 2022-05-08 | End: 2022-05-08

## 2022-05-08 RX ORDER — ONDANSETRON 2 MG/ML
4 INJECTION INTRAMUSCULAR; INTRAVENOUS ONCE
Status: DISCONTINUED | OUTPATIENT
Start: 2022-05-08 | End: 2022-05-08

## 2022-05-08 RX ORDER — DEXAMETHASONE SODIUM PHOSPHATE 10 MG/ML
10 INJECTION, SOLUTION INTRAMUSCULAR; INTRAVENOUS ONCE
Status: DISCONTINUED | OUTPATIENT
Start: 2022-05-08 | End: 2022-05-08

## 2022-05-08 RX ORDER — MAGNESIUM CARB/ALUMINUM HYDROX 105-160MG
150 TABLET,CHEWABLE ORAL ONCE
Status: COMPLETED | OUTPATIENT
Start: 2022-05-08 | End: 2022-05-08

## 2022-05-08 RX ADMIN — CITROMA MAGNESIUM CITRATE 150 ML: 1.75 LIQUID ORAL at 07:30

## 2022-05-08 RX ADMIN — SODIUM CHLORIDE 1000 ML: 9 INJECTION, SOLUTION INTRAVENOUS at 03:50

## 2022-05-08 RX ADMIN — DIPHENHYDRAMINE HYDROCHLORIDE 25 MG: 50 INJECTION INTRAMUSCULAR; INTRAVENOUS at 05:38

## 2022-05-08 RX ADMIN — ONDANSETRON 4 MG: 2 INJECTION INTRAMUSCULAR; INTRAVENOUS at 03:49

## 2022-05-08 RX ADMIN — ORPHENADRINE CITRATE 60 MG: 30 INJECTION INTRAMUSCULAR; INTRAVENOUS at 05:05

## 2022-05-08 RX ADMIN — DEXAMETHASONE SODIUM PHOSPHATE 10 MG: 4 INJECTION, SOLUTION INTRA-ARTICULAR; INTRALESIONAL; INTRAMUSCULAR; INTRAVENOUS; SOFT TISSUE at 05:05

## 2022-05-08 RX ADMIN — KETOROLAC TROMETHAMINE 15 MG: 30 INJECTION, SOLUTION INTRAMUSCULAR at 05:05

## 2022-05-08 NOTE — ED NOTES
"Asked patient to provide urine specimen at this time, patient states \"My daughter just took me to the bathroom.\" Advised need for specimen.  "

## 2022-05-09 NOTE — ED PROVIDER NOTES
Subjective   Patient is an 81-year-old female with significant past medical history positive for CAD, diabetes type 2, GERD, hyperlipidemia, hypertension, osteoporosis, scoliosis presenting to the ER complaints of low back pain, dizziness, and hypertension.  Patient states that she has had her blood pressure medications adjusted by her primary care doctor and cardiologist.  Patient states that since she has been having hypertension.  Patient states she has a follow-up appointment next week.  Patient states she was concerned when her blood pressure got too elevated.  Patient states that she is also reporting low back pain.  And right hip pain.  Patient denies any injury.  Patient states that she has scoliosis and seen her Dr. Herman Bob for the low back pain and he reports that it is related to her scoliosis.  Patient denies any urinary frequency, dysuria, retention.  Patient also reports dizziness from sitting to standing.  Patient states that she gets dizzy very often.  Patient reports that she was started on hydrocodone by her primary care doctor and since she started on its the only thing that helped with pain but also has caused this dizziness.  Patient denies any chest pain shortness of breath cough fever nausea vomiting diarrhea or any additional symptoms today.      History provided by:  Patient   used: No        Review of Systems   Constitutional: Negative.  Negative for fever.        Hypertension   HENT: Negative.    Respiratory: Negative.    Cardiovascular: Negative.  Negative for chest pain.   Gastrointestinal: Negative.  Negative for abdominal pain.   Endocrine: Negative.    Genitourinary: Negative.  Negative for dysuria.   Musculoskeletal: Positive for back pain.        Right hip pain   Skin: Negative.    Neurological: Positive for dizziness.   Psychiatric/Behavioral: Negative.    All other systems reviewed and are negative.      Past Medical History:   Diagnosis Date   • Balance  problem     lack of balance between leisure activites and work   • Coronary artery disease    • COVID    • Diabetes mellitus (HCC)     diet controlled. Checks blood sugar once a day. diagnosed 4 years   • GERD (gastroesophageal reflux disease)    • Glaucoma    • Gout    • Heart palpitations    • Hiatal hernia    • History of transfusion     1963   • Hypercholesterolemia    • Hypertension    • Low back pain    • Mitral valve stenosis    • Osteoarthritis    • Osteoporosis    • PONV (postoperative nausea and vomiting)    • Scoliosis    • Skin cancer    • Spinal headache     mild   • Wears glasses        Allergies   Allergen Reactions   • Codeine Mental Status Change   • Meperidine Mental Status Change   • Morphine Mental Status Change   • Statins Myalgia     Zocor, lipitor, welchol and pravachol   • Sulfa Antibiotics Rash   • Other Swelling     Swelling at site of insertion   • Influenza Virus Vaccine Swelling       Past Surgical History:   Procedure Laterality Date   • CARDIAC CATHETERIZATION N/A 6/22/2016    Procedure: right and left heart, abnormal stress, abnormal echo revealing pulm HTN;  Surgeon: Pancho Casey MD;  Location: [facility] INVASIVE LOCATION;  Service:    • CATARACT EXTRACTION     • COLONOSCOPY      2015   • GLAUCOMA SURGERY     • HYSTERECTOMY     • TUBAL ABDOMINAL LIGATION         Family History   Problem Relation Age of Onset   • Cancer Other    • Diabetes Other    • Gout Other    • Heart disease Other    • Hypertension Other    • Osteoarthritis Other    • Osteoporosis Other    • Rheum arthritis Other    • Osteoarthritis Mother    • Cancer Mother    • Hypertension Mother    • COPD Father    • Cancer Sister    • Osteoarthritis Sister    • Hypertension Sister    • Cancer Maternal Grandfather    • Breast cancer Neg Hx        Social History     Socioeconomic History   • Marital status:    Tobacco Use   • Smoking status: Never Smoker   • Smokeless tobacco: Never Used   Vaping Use   • Vaping  Use: Never used   Substance and Sexual Activity   • Alcohol use: No   • Drug use: No   • Sexual activity: Defer           Objective   Physical Exam  Vitals and nursing note reviewed.   Constitutional:       General: She is not in acute distress.     Appearance: She is well-developed. She is not diaphoretic.   HENT:      Head: Normocephalic and atraumatic.      Right Ear: External ear normal.      Left Ear: External ear normal.      Nose: Nose normal.   Eyes:      Conjunctiva/sclera: Conjunctivae normal.      Pupils: Pupils are equal, round, and reactive to light.   Neck:      Vascular: No JVD.      Trachea: No tracheal deviation.   Cardiovascular:      Rate and Rhythm: Normal rate. Rhythm irregular.      Heart sounds: Normal heart sounds. No murmur heard.  Pulmonary:      Effort: Pulmonary effort is normal. No respiratory distress.      Breath sounds: Normal breath sounds. No wheezing.   Abdominal:      General: Bowel sounds are normal.      Palpations: Abdomen is soft.      Tenderness: There is no abdominal tenderness.   Musculoskeletal:         General: Tenderness present. No swelling or deformity.      Cervical back: Normal range of motion and neck supple.      Lumbar back: Tenderness present. Decreased range of motion.   Skin:     General: Skin is warm and dry.      Capillary Refill: Capillary refill takes less than 2 seconds.      Coloration: Skin is not pale.      Findings: No erythema or rash.   Neurological:      General: No focal deficit present.      Mental Status: She is alert and oriented to person, place, and time. Mental status is at baseline.      GCS: GCS eye subscore is 4. GCS verbal subscore is 5. GCS motor subscore is 6.      Cranial Nerves: No cranial nerve deficit.      Sensory: Sensation is intact.      Motor: Motor function is intact.      Coordination: Coordination is intact.      Gait: Gait is intact.      Deep Tendon Reflexes: Reflexes normal. Babinski sign absent on the right side.  Babinski sign absent on the left side.      Reflex Scores:       Tricep reflexes are 2+ on the right side and 2+ on the left side.       Bicep reflexes are 2+ on the right side and 2+ on the left side.       Brachioradialis reflexes are 2+ on the right side and 2+ on the left side.       Patellar reflexes are 2+ on the right side and 2+ on the left side.       Achilles reflexes are 2+ on the right side and 2+ on the left side.  Psychiatric:         Mood and Affect: Mood normal.         Behavior: Behavior normal.         Thought Content: Thought content normal.         Judgment: Judgment normal.         Procedures           ED Course  ED Course as of 05/08/22 2331   Sun May 08, 2022   0426 A. fib 87 bpm low voltage technically poor R wave progression but looks normal for low voltage no ST segment elevation or depression T waves are buried within A. fib. []   0451 CT Abdomen Pelvis Without Contrast     IMPRESSION:     1. No acute abdominal or pelvic findings allowing for lack of IV contrast.  2. Cholelithiasis.  3. Appendix not well visualized. No pericecal inflammation.  4. Uncomplicated colonic diverticulosis. Moderate stool burden.  5. Mild cardiomegaly.  6. Small hiatal hernia.  7. Hysterectomy.              Signer Name: Jono Maat MD   Signed: 5/8/2022 4:37 AM   Workstation Name: [facility]    [facility] []   0451 CT Head Without Contrast  IMPRESSION:  No acute intracranial abnormality. Stable senescent changes.              Signer Name: Jono Mata MD   Signed: 5/8/2022 4:33 AM   Workstation Name: [facility]    [facility] []   0615 Patient advises she can not tolerate lying flat for the MRI regardless of premedication or not. Patient adamant. Discussed with patient risk and benefits of MRI study for further diagnostics. Patient verbalized understanding and still refuses.  []   7619 Discussed case with Dr. Vargas he advised to do MRI Brain  without for further evaluation of dizziness.  [SM]   0709 Discussed case with Dr. Vargas advised to have patient follow up with PCP and return to the ER with new or worsening symptoms.  [SM]   0711 Work up and results were discussed throughly with the patients.  The patient will be discharged for further monitoring and management with their PCP.  Red flags, warning signs, worsening symptoms, and when to return to the ER discussed with and understood by the patients.  Patient will follow up with their PCP in a timely manner.  Vitals stable at discharge.  [SM]   0711 This care is provided during an unprecedented national emergency due to the Novel Coronavirus (COVID-19). COVID-19 infections and transmission risks place heavy strains on healthcare resources. As this pandemic evolves, the Hospital and providers strive to respond fluidly, to remain operational, and to provide care relative to available resources and information. Outcomes are unpredictable and treatments are without well-defined guidelines. Further, the impact of COVID-19 on all aspects of emergency care, including the impact to patients seeking care for reasons other than COVID-19, is unavoidable during this national emergency.    This note was dictated using a [id number] tool. Occasional wrong-word or 'sound-a-like' substitutions may have occurred due to the inherent limitations of voice recognition software.  Read the chart carefully and recognize, using context, where substitutions have occurred.  [SM]      ED Course User Index  [JM] Gareth Vargas MD  [SM] Susan Shay, APRN                                                 [facility]    Final diagnoses:   Primary hypertension   Right hip pain   Constipation, unspecified constipation type       ED Disposition  ED Disposition     ED Disposition   Discharge    Condition   Stable    Comment   --             Herman Bob, PA  [address] [place] [address]  [phone]    Schedule an  appointment as soon as possible for a visit in 2 days           Medication List      No changes were made to your prescriptions during this visit.          Susan Shay, APRN  05/08/22 5768

## 2022-05-12 ENCOUNTER — PATIENT OUTREACH (OUTPATIENT)
Dept: CASE MANAGEMENT | Facility: OTHER | Age: 82
End: 2022-05-12

## 2022-05-12 NOTE — OUTREACH NOTE
"AMBULATORY CASE MANAGEMENT NOTE    Name and Relationship of Patient/Support Person: Juan, Wava P - Self     Patient Outreach    Patient had an ED visit 5/07/22 - 5/08/22 for hypertension, constipation, and R hip pain. Patient reports feeling \"OK\" today, still having some pain and constipation issues. Pt reports she did have a BM this morning. RN-ACM provided education on constipation mitigation strategies; HTN mgmt; discussed opioid pain medications and constipation. Patient states she used to drink a lot of water but stopped; says she will start drinking more water again as well as fruits/vegetables and juices. Pt has f/u appointments scheduled with providers. 24/7 nurse line number and ACM contact information provided; Care plan completed, goals reviewed. Next outreach scheduled.    Adult Patient Profile  Questions/Answers    Flowsheet Row Most Recent Value   Symptoms/Conditions Managed at Home cardiovascular, gastrointestinal   Barriers to Managing Health none   Cardiovascular Symptoms/Conditions hypertension   Cardiovascular Management Strategies medication therapy, diet modification, exercise, routine screening, adequate rest, activity   Gastrointestinal Symptoms/Conditions reflux/heartburn, nausea, other (see comments)  [Constipation]   Gastrointestinal Management Strategies fluid modification, exercise, diet modification, nutrition support, activity, medication therapy   Taking Medications Not Prescribed no   Missed Doses of Prescribed Medications During Past Week no   Taken Prescribed Medications at Different Time or Schedule During Past Week no   Taken More or Less Medication Than Prescribed no   Barriers to Taking Medication as Prescribed none   Usual Activity Tolerance good   Current Activity Tolerance moderate          Education Documentation  Medication Management, taught by Andreia Washington, RN at 5/12/2022  1:32 PM.  Learner: Patient  Readiness: Eager  Method: Explanation, Teach Back  Response: " Verbalizes Understanding    Diet Modification, taught by John Washington RN at 5/12/2022  1:32 PM.  Learner: Patient  Readiness: Eager  Method: Explanation, Teach Back  Response: Verbalizes Understanding    Activity, taught by John Washington RN at 5/12/2022  1:32 PM.  Learner: Patient  Readiness: Eager  Method: Explanation, Teach Back  Response: Verbalizes Understanding    Signs/Symptoms, taught by John Washington RN at 5/12/2022  1:32 PM.  Learner: Patient  Readiness: Eager  Method: Explanation, Teach Back  Response: Verbalizes Understanding    Risk Factors, taught by John Washington RN at 5/12/2022  1:32 PM.  Learner: Patient  Readiness: Eager  Method: Explanation, Teach Back  Response: Verbalizes Understanding    Managing Your Hypertension, taught by John Washington, RN at 5/12/2022  1:32 PM.  Learner: Patient  Readiness: Eager  Method: Explanation, Teach Back  Response: Verbalizes Understanding    Unresolved/Worsening Symptoms, taught by John Washington RN at 5/12/2022  1:29 PM.  Learner: Patient  Readiness: Acceptance  Method: Explanation  Response: Verbalizes Understanding    Self-Care, taught by John Washington, RN at 5/12/2022  1:29 PM.  Learner: Patient  Readiness: Acceptance  Method: Explanation  Response: Verbalizes Understanding    Blood Pressure Monitoring, taught by John Washington, RN at 5/12/2022  1:29 PM.  Learner: Patient  Readiness: Acceptance  Method: Explanation  Response: Verbalizes Understanding          JOHN CENTENO  Ambulatory Case Management    5/12/2022, 13:33 EDT

## 2022-05-13 LAB
BACTERIA SPEC AEROBE CULT: NORMAL
BACTERIA SPEC AEROBE CULT: NORMAL

## 2022-06-13 ENCOUNTER — PATIENT OUTREACH (OUTPATIENT)
Dept: CASE MANAGEMENT | Facility: OTHER | Age: 82
End: 2022-06-13

## 2022-06-13 NOTE — OUTREACH NOTE
AMBULATORY CASE MANAGEMENT NOTE    Name and Relationship of Patient/Support Person: Elmo Martinez P - Self     Patient Outreach    Patient reports she has been doing very well, no other issues with HTN or constipation reported. Patient has managed her constipation with diet/medication effectively. Care plan reviewed/updated, education provided. Pt sees PCP again this month. RN-ACM will monitor and outreach/graduate as appropriate. Pt has ACM contact info; [facility] sent via [employer].    Adult Patient Profile  Questions/Answers    Flowsheet Row Most Recent Value   Symptoms/Conditions Managed at Home cardiovascular, gastrointestinal   Barriers to Managing Health none   Cardiovascular Symptoms/Conditions hypertension   Cardiovascular Management Strategies medication therapy, diet modification, exercise, routine screening, adequate rest, activity   Gastrointestinal Symptoms/Conditions other (see comments), constipation  [Current symptoms are well managed]   Gastrointestinal Management Strategies fluid modification, nutrition support, medication therapy, activity, adequate rest, diet modification   Taking Medications Not Prescribed no   Missed Doses of Prescribed Medications During Past Week no   Taken Prescribed Medications at Different Time or Schedule During Past Week no   Taken More or Less Medication Than Prescribed no   Barriers to Taking Medication as Prescribed none          Education Documentation  Unresolved/Worsening Symptoms, taught by Andreia Washington, RN at 6/13/2022 11:32 AM.  Learner: Patient  Readiness: Acceptance  Method: Explanation, Teach Back  Response: Verbalizes Understanding, Demonstrated Understanding    Medication Management, taught by Andreia Washington, RN at 6/13/2022 11:32 AM.  Learner: Patient  Readiness: Acceptance  Method: Explanation, Teach Back  Response: Verbalizes Understanding, Demonstrated Understanding    Regular Elimination, taught by Andreia Washington, RN at 6/13/2022 11:32 AM.  Learner:  Patient  Readiness: Acceptance  Method: Explanation, Teach Back  Response: Verbalizes Understanding, Demonstrated Understanding    Diet Modification, taught by John Washington, RN at 6/13/2022 11:32 AM.  Learner: Patient  Readiness: Acceptance  Method: Explanation, Teach Back  Response: Verbalizes Understanding, Demonstrated Understanding    Activity, taught by John Washington, RN at 6/13/2022 11:32 AM.  Learner: Patient  Readiness: Acceptance  Method: Explanation, Teach Back  Response: Verbalizes Understanding, Demonstrated Understanding    Signs/Symptoms, taught by John Washington, RN at 6/13/2022 11:32 AM.  Learner: Patient  Readiness: Acceptance  Method: Explanation, Teach Back  Response: Verbalizes Understanding, Demonstrated Understanding    Self-Care, taught by John Washington, RN at 6/13/2022 11:32 AM.  Learner: Patient  Readiness: Acceptance  Method: Explanation, Teach Back  Response: Verbalizes Understanding, Demonstrated Understanding    Provider Follow-Up, taught by John Washington, RN at 6/13/2022 11:32 AM.  Learner: Patient  Readiness: Acceptance  Method: Explanation, Teach Back  Response: Verbalizes Understanding, Demonstrated Understanding          JOHN CENTENO  Ambulatory Case Management    6/13/2022, 11:33 EDT

## 2022-07-08 NOTE — PROGRESS NOTES
"[facility]    Encounter Date: 2022    Patient ID: Elmo Martinez is a 81 y.o. female.  : 1940     PCP: Herman Bob PA       Chief Complaint: Atrial fibrillation, unspecified type (HCC)      PROBLEM LIST:  1. Paroxysmal atrial fibrillation  a. 2 week monitor 2021: SR with occasional PACs with short runs of SVT vs possible Afib   b. Echo 2021: EF 66-70%, mild AS, moderate MR, mild MS, elevated RVSP  2. Chest pain  a. Echocardiogram with LVEF >65%, mild LVH 3/29/13  b. Normal regadenoson cardiolite stress with LVEF 76% 3/29/13  c. Echocardiogram LVEF 60%-65%, mod MR, mild to mod TR and evidence severe pulmonary HTN 16  d. Abnormal lexiscan nuclear stress 16  e. LHC, 2016:  Normal LV function, normal coronaries, normal pulmonary artery pressures.  f. Echo, 2018: \"severe PA hypertension\" and mild MS\"  3. Hypertension  4. Dyslipidemia, intolerant to statins secondary to myalgias and elevated CPK   5. Diabetes Mellitus with neuropathy  6. Carotid artery disease  a. Carotid duplex 2020: LICA 50-69% stenosis, asymptomatic   7. GERD and hiatal hernia  8. Osteoarthritis  9. Skin cancer  10. Glaucoma  11. History of gout  12. Scoliosis  13. Small stomach ulcers  14. Surgical history: hysterectomy, glaucoma, appendectomy       History of Present Illness  Patient presents today for a 6 month follow-up with a history of atrial fibrillation and cardiac risk factors. Since last visit, patient visited the ED due to hip pain. She was then discharge with a laxative drink and a diagnosis of constipation. She was not vomiting at the time. She is doing better and no longer has any fluttering or palpitations. She discontinued Imdur due to it causing her to feel \"foggy-headed\". Patient denies chest pain, shortness of breath, orthopnea, palpitations, edema, dizziness, and syncope.        Allergies   Allergen Reactions   • Codeine Mental Status " Change   • Meperidine Mental Status Change   • Morphine Mental Status Change   • Statins Myalgia     Zocor, lipitor, welchol and pravachol   • Sulfa Antibiotics Rash   • Other Swelling     Swelling at site of insertion   • Imdur [Isosorbide Nitrate] Mental Status Change   • Influenza Virus Vaccine Swelling         Current Outpatient Medications:   •  allopurinol (ZYLOPRIM) 300 MG tablet, Take 300 mg by mouth As Needed., Disp: , Rfl:   •  ALPRAZolam (XANAX) 0.5 MG tablet, Take 0.5 mg by mouth 2 (two) times a day as needed. Takes 0.25 prn, Disp: , Rfl:   •  apixaban (ELIQUIS) 5 MG tablet tablet, Take 1 tablet by mouth Every 12 (Twelve) Hours., Disp: 180 tablet, Rfl: 3  •  aspirin 81 MG tablet, Take 81 mg by mouth daily., Disp: , Rfl:   •  brimonidine (ALPHAGAN) 0.2 % ophthalmic solution, 1 drop 2 (Two) Times a Day., Disp: , Rfl:   •  Diclofenac Sodium (VOLTAREN) 1 % gel gel, Apply 4 g topically to the appropriate area as directed 4 (Four) Times a Day As Needed., Disp: , Rfl:   •  dilTIAZem CD (CARDIZEM CD) 240 MG 24 hr capsule, Take 1 capsule by mouth Daily., Disp: 90 capsule, Rfl: 3  •  dorzolamide-timolol (COSOPT) 22.3-6.8 MG/ML ophthalmic solution, Administer 1 drop to both eyes 2 (Two) Times a Day., Disp: , Rfl:   •  Evolocumab (REPATHA) solution auto-injector SureClick injection, Inject 1 mL under the skin into the appropriate area as directed Every 14 (Fourteen) Days., Disp: 2 pen, Rfl: 11  •  gabapentin (NEURONTIN) 800 MG tablet, Take 800 mg by mouth 2 (two) times a day., Disp: , Rfl:   •  HYDROcodone-acetaminophen (NORCO) 7.5-325 MG per tablet, Take 1 tablet by mouth every 12 (twelve) hours as needed for moderate pain (4-6) (right shoulder pain)., Disp: , Rfl:   •  lisinopril (PRINIVIL,ZESTRIL) 20 MG tablet, Take 20 mg by mouth Daily., Disp: , Rfl:   •  meclizine (ANTIVERT) 25 MG tablet, Take 25 mg by mouth 3 (Three) Times a Day As Needed for dizziness., Disp: , Rfl:   •  metoprolol tartrate (LOPRESSOR) 100 MG  "tablet, Take 1 tablet by mouth 2 (Two) Times a Day., Disp: 180 tablet, Rfl: 3  •  nitroglycerin (NITROSTAT) 0.4 MG SL tablet, Place 0.4 mg under the tongue Every 5 (Five) Minutes As Needed for Chest Pain. Take no more than 3 doses in 15 minutes., Disp: , Rfl:   •  ondansetron (ZOFRAN) 4 MG tablet, Take 4 mg by mouth Every 8 (Eight) Hours As Needed for Nausea or Vomiting., Disp: , Rfl:   •  pantoprazole (PROTONIX) 40 MG EC tablet, Take 40 mg by mouth Daily., Disp: , Rfl:   •  Polyethylene Glycol 1000 powder, As Needed., Disp: , Rfl:   •  FIBER PO, Take  by mouth Daily., Disp: , Rfl:   •  timolol (TIMOPTIC) 0.5 % ophthalmic solution, Administer 1 drop to both eyes 2 (Two) Times a Day., Disp: , Rfl:     The following portions of the patient's history were reviewed and updated as appropriate: allergies, current medications, past family history, past medical history, past social history, past surgical history and problem list.    ROS  Review of Systems   Constitution: Negative for chills, fever, fatigue, generalized weakness.   Cardiovascular: Negative for chest pain, dyspnea on exertion, leg swelling, palpitations, orthopnea, and syncope.   Respiratory: Negative for cough, shortness of breath, and wheezing.  HENT: Negative for ear pain, nosebleeds, and tinnitus.  Gastrointestinal: Negative for abdominal pain, constipation, diarrhea, nausea and vomiting.   Genitourinary: No urinary symptoms.  Musculoskeletal: Negative for muscle cramps.  Neurological: Negative for dizziness, headaches, loss of balance, numbness, and symptoms of stroke.  Psychiatric: Normal mental status.     All other systems reviewed and are negative.        Objective:     /78 (BP Location: Left arm, Patient Position: Sitting)   Pulse 79   Ht 157.5 cm (62\")   Wt 78 kg (172 lb)   LMP  (LMP Unknown)   SpO2 95%   BMI 31.46 kg/m²      Physical Exam  Constitutional: Patient appears well-developed and well-nourished.   HENT: HEENT exam " unremarkable.   Neck: Neck supple. No JVD present. No carotid bruits.   Cardiovascular: Normal rate, regular rhythm and normal heart sounds. No murmur heard.   2+ symmetric pulses.   Pulmonary/Chest: Breath sounds normal. Does not exhibit tenderness.   Abdominal: Abdomen benign.   Musculoskeletal: Does not exhibit edema.   Neurological: Neurological exam unremarkable.   Vitals reviewed.    Data Review:     Lab date: 06/16/2022  • FLP: , , HDL 56, LDL 27  • CMP: Glu 102, BUN 8, Creat 0.85, eGFR 69, Na 141, K 5.1, Cl 101, CO2 26, Ca 9.4, Alk Phos 87, AST 11, ALT 8  • CBC: WBC 10.2, RBC 5.27, HGB 12.6, HCT 42.2, MCV 80, MCH 23.9,           Procedures             Assessment:      Diagnosis Plan   1. Atrial fibrillation, unspecified type (HCC)  No recurring palpitations. Continue on Eliquis 5 mg BID, aspirin 81 mg, and metoprolol 100 mg.    2. Essential hypertension  Well controlled. Continue on diltiazem 240 mg, lisinopril 20 mg.    3. Mixed hyperlipidemia  Well controlled. Continue on repatha 140 mg.      Plan:   Stable cardiac status.   Continue all other current medications.   FU in 6 MO, sooner as needed.  Thank you for allowing us to participate in the care of your patient.       Scribed for Bernice Amezquita MD by Eloise Sam. 7/12/2022 11:40 EDT         I, Bernice Amezquita MD, personally performed the services described in this documentation as scribed by the above named individual in my presence, and it is both accurate and complete.  7/12/2022  13:09 EDT        Please note that portions of this note may have been completed with a voice recognition program. Efforts were made to edit the dictations, but occasionally words are mistranscribed.

## 2022-07-12 ENCOUNTER — OFFICE VISIT (OUTPATIENT)
Dept: CARDIOLOGY | Facility: CLINIC | Age: 82
End: 2022-07-12

## 2022-07-12 VITALS
WEIGHT: 172 LBS | SYSTOLIC BLOOD PRESSURE: 124 MMHG | HEIGHT: 62 IN | HEART RATE: 79 BPM | DIASTOLIC BLOOD PRESSURE: 78 MMHG | OXYGEN SATURATION: 95 % | BODY MASS INDEX: 31.65 KG/M2

## 2022-07-12 DIAGNOSIS — I48.91 ATRIAL FIBRILLATION, UNSPECIFIED TYPE: Primary | ICD-10-CM

## 2022-07-12 DIAGNOSIS — E78.2 MIXED HYPERLIPIDEMIA: ICD-10-CM

## 2022-07-12 DIAGNOSIS — I10 ESSENTIAL HYPERTENSION: ICD-10-CM

## 2022-07-12 PROCEDURE — 99214 OFFICE O/P EST MOD 30 MIN: CPT | Performed by: INTERNAL MEDICINE

## 2022-07-12 RX ORDER — POLYETHYLENE GLYCOL 1000
POWDER (GRAM) MISCELLANEOUS AS NEEDED
COMMUNITY

## 2022-07-12 RX ORDER — DORZOLAMIDE HYDROCHLORIDE AND TIMOLOL MALEATE 20; 5 MG/ML; MG/ML
1 SOLUTION/ DROPS OPHTHALMIC 2 TIMES DAILY
COMMUNITY
Start: 2022-06-21

## 2022-07-12 RX ORDER — ONDANSETRON 4 MG/1
4 TABLET, FILM COATED ORAL EVERY 8 HOURS PRN
COMMUNITY

## 2022-07-13 ENCOUNTER — PATIENT OUTREACH (OUTPATIENT)
Dept: CASE MANAGEMENT | Facility: OTHER | Age: 82
End: 2022-07-13

## 2022-07-13 NOTE — OUTREACH NOTE
AMBULATORY CASE MANAGEMENT NOTE    Name and Relationship of Patient/Support Person:  -     Care Coordination    Chart review completed. No acute events noted; pt has not outreached for further assistance. Pt has completed provider f/u. ACM will close program for this episode.        JOHN CENTENO  Ambulatory Case Management    7/13/2022, 10:58 EDT

## 2022-12-13 RX ORDER — DILTIAZEM HYDROCHLORIDE 240 MG/1
240 CAPSULE, COATED, EXTENDED RELEASE ORAL DAILY
Qty: 90 CAPSULE | Refills: 3 | Status: SHIPPED | OUTPATIENT
Start: 2022-12-13

## 2023-01-03 RX ORDER — METOPROLOL TARTRATE 100 MG/1
100 TABLET ORAL 2 TIMES DAILY
Qty: 180 TABLET | Refills: 3 | Status: SHIPPED | OUTPATIENT
Start: 2023-01-03

## 2023-01-11 ENCOUNTER — TELEPHONE (OUTPATIENT)
Dept: CARDIOLOGY | Facility: CLINIC | Age: 83
End: 2023-01-11
Payer: MEDICARE

## 2023-01-11 NOTE — TELEPHONE ENCOUNTER
"Attempted PA for repatha and this messaged came up.   \"This medication or product was previously approved on [id number] from 2023-01-01 to 2023-12-31\"    Key: [id number]  "

## 2023-02-22 ENCOUNTER — HOSPITAL ENCOUNTER (EMERGENCY)
Facility: HOSPITAL | Age: 83
Discharge: HOME | End: 2023-02-22
Attending: STUDENT IN AN ORGANIZED HEALTH CARE EDUCATION/TRAINING PROGRAM | Admitting: STUDENT IN AN ORGANIZED HEALTH CARE EDUCATION/TRAINING PROGRAM
Payer: MEDICARE

## 2023-02-22 ENCOUNTER — APPOINTMENT (OUTPATIENT)
Dept: CT IMAGING | Facility: HOSPITAL | Age: 83
End: 2023-02-22
Payer: MEDICARE

## 2023-02-22 ENCOUNTER — APPOINTMENT (OUTPATIENT)
Dept: GENERAL RADIOLOGY | Facility: HOSPITAL | Age: 83
End: 2023-02-22
Payer: MEDICARE

## 2023-02-22 VITALS
HEART RATE: 74 BPM | RESPIRATION RATE: 18 BRPM | WEIGHT: 175 LBS | OXYGEN SATURATION: 95 % | TEMPERATURE: 98.5 F | HEIGHT: 62 IN | BODY MASS INDEX: 32.2 KG/M2 | SYSTOLIC BLOOD PRESSURE: 169 MMHG | DIASTOLIC BLOOD PRESSURE: 107 MMHG

## 2023-02-22 DIAGNOSIS — U07.1 COVID-19: Primary | ICD-10-CM

## 2023-02-22 DIAGNOSIS — I50.9 HEART FAILURE, UNSPECIFIED HF CHRONICITY, UNSPECIFIED HEART FAILURE TYPE: ICD-10-CM

## 2023-02-22 LAB
ALBUMIN SERPL-MCNC: 3.8 G/DL (ref 3.5–5.2)
ALBUMIN/GLOB SERPL: 1.3 G/DL
ALP SERPL-CCNC: 85 U/L (ref 39–117)
ALT SERPL W P-5'-P-CCNC: 6 U/L (ref 1–33)
ANION GAP SERPL CALCULATED.3IONS-SCNC: 12.6 MMOL/L (ref 5–15)
AST SERPL-CCNC: 17 U/L (ref 1–32)
BASOPHILS # BLD AUTO: 0.11 10*3/MM3 (ref 0–0.2)
BASOPHILS NFR BLD AUTO: 0.8 % (ref 0–1.5)
BILIRUB SERPL-MCNC: 1 MG/DL (ref 0–1.2)
BUN SERPL-MCNC: 10 MG/DL (ref 8–23)
BUN/CREAT SERPL: 12.8 (ref 7–25)
CALCIUM SPEC-SCNC: 9.2 MG/DL (ref 8.6–10.5)
CHLORIDE SERPL-SCNC: 103 MMOL/L (ref 98–107)
CO2 SERPL-SCNC: 24.4 MMOL/L (ref 22–29)
CREAT SERPL-MCNC: 0.78 MG/DL (ref 0.57–1)
CRP SERPL-MCNC: 1.86 MG/DL (ref 0–0.5)
D-LACTATE SERPL-SCNC: 1.6 MMOL/L (ref 0.5–2)
DEPRECATED RDW RBC AUTO: 50.8 FL (ref 37–54)
EGFRCR SERPLBLD CKD-EPI 2021: 76.4 ML/MIN/1.73
EOSINOPHIL # BLD AUTO: 0.07 10*3/MM3 (ref 0–0.4)
EOSINOPHIL NFR BLD AUTO: 0.5 % (ref 0.3–6.2)
ERYTHROCYTE [DISTWIDTH] IN BLOOD BY AUTOMATED COUNT: 16.8 % (ref 12.3–15.4)
ERYTHROCYTE [SEDIMENTATION RATE] IN BLOOD: 21 MM/HR (ref 0–30)
FLUAV RNA RESP QL NAA+PROBE: NOT DETECTED
FLUBV RNA ISLT QL NAA+PROBE: NOT DETECTED
GLOBULIN UR ELPH-MCNC: 2.9 GM/DL
GLUCOSE SERPL-MCNC: 117 MG/DL (ref 65–99)
HCT VFR BLD AUTO: 36 % (ref 34–46.6)
HGB BLD-MCNC: 10.7 G/DL (ref 12–15.9)
HOLD SPECIMEN: NORMAL
HOLD SPECIMEN: NORMAL
IMM GRANULOCYTES # BLD AUTO: 0.08 10*3/MM3 (ref 0–0.05)
IMM GRANULOCYTES NFR BLD AUTO: 0.6 % (ref 0–0.5)
LYMPHOCYTES # BLD AUTO: 1.92 10*3/MM3 (ref 0.7–3.1)
LYMPHOCYTES NFR BLD AUTO: 13.4 % (ref 19.6–45.3)
MCH RBC QN AUTO: 24.8 PG (ref 26.6–33)
MCHC RBC AUTO-ENTMCNC: 29.7 G/DL (ref 31.5–35.7)
MCV RBC AUTO: 83.5 FL (ref 79–97)
MONOCYTES # BLD AUTO: 1.43 10*3/MM3 (ref 0.1–0.9)
MONOCYTES NFR BLD AUTO: 10 % (ref 5–12)
NEUTROPHILS NFR BLD AUTO: 10.71 10*3/MM3 (ref 1.7–7)
NEUTROPHILS NFR BLD AUTO: 74.7 % (ref 42.7–76)
NRBC BLD AUTO-RTO: 0 /100 WBC (ref 0–0.2)
PLATELET # BLD AUTO: 262 10*3/MM3 (ref 140–450)
PMV BLD AUTO: 11.2 FL (ref 6–12)
POTASSIUM SERPL-SCNC: 4.5 MMOL/L (ref 3.5–5.2)
PROCALCITONIN SERPL-MCNC: 0.06 NG/ML (ref 0–0.25)
PROT SERPL-MCNC: 6.7 G/DL (ref 6–8.5)
QT INTERVAL: 412 MS
QTC INTERVAL: 490 MS
RBC # BLD AUTO: 4.31 10*6/MM3 (ref 3.77–5.28)
SARS-COV-2 RNA RESP QL NAA+PROBE: DETECTED
SODIUM SERPL-SCNC: 140 MMOL/L (ref 136–145)
TSH SERPL DL<=0.05 MIU/L-ACNC: 3.15 UIU/ML (ref 0.27–4.2)
WBC NRBC COR # BLD: 14.32 10*3/MM3 (ref 3.4–10.8)
WHOLE BLOOD HOLD COAG: NORMAL
WHOLE BLOOD HOLD SPECIMEN: NORMAL

## 2023-02-22 PROCEDURE — 85652 RBC SED RATE AUTOMATED: CPT | Performed by: PHYSICIAN ASSISTANT

## 2023-02-22 PROCEDURE — 93005 ELECTROCARDIOGRAM TRACING: CPT | Performed by: PHYSICIAN ASSISTANT

## 2023-02-22 PROCEDURE — 85025 COMPLETE CBC W/AUTO DIFF WBC: CPT | Performed by: PHYSICIAN ASSISTANT

## 2023-02-22 PROCEDURE — 84443 ASSAY THYROID STIM HORMONE: CPT | Performed by: PHYSICIAN ASSISTANT

## 2023-02-22 PROCEDURE — 70450 CT HEAD/BRAIN W/O DYE: CPT

## 2023-02-22 PROCEDURE — 84145 PROCALCITONIN (PCT): CPT | Performed by: PHYSICIAN ASSISTANT

## 2023-02-22 PROCEDURE — 87040 BLOOD CULTURE FOR BACTERIA: CPT | Performed by: PHYSICIAN ASSISTANT

## 2023-02-22 PROCEDURE — 86140 C-REACTIVE PROTEIN: CPT | Performed by: PHYSICIAN ASSISTANT

## 2023-02-22 PROCEDURE — 87636 SARSCOV2 & INF A&B AMP PRB: CPT | Performed by: PHYSICIAN ASSISTANT

## 2023-02-22 PROCEDURE — 83605 ASSAY OF LACTIC ACID: CPT | Performed by: PHYSICIAN ASSISTANT

## 2023-02-22 PROCEDURE — 36415 COLL VENOUS BLD VENIPUNCTURE: CPT

## 2023-02-22 PROCEDURE — 99284 EMERGENCY DEPT VISIT MOD MDM: CPT

## 2023-02-22 PROCEDURE — 80053 COMPREHEN METABOLIC PANEL: CPT | Performed by: PHYSICIAN ASSISTANT

## 2023-02-22 PROCEDURE — 71045 X-RAY EXAM CHEST 1 VIEW: CPT

## 2023-02-22 PROCEDURE — 93010 ELECTROCARDIOGRAM REPORT: CPT | Performed by: INTERNAL MEDICINE

## 2023-02-22 RX ORDER — DOXYCYCLINE 100 MG/1
100 CAPSULE ORAL 2 TIMES DAILY
Qty: 14 CAPSULE | Refills: 0 | Status: SHIPPED | OUTPATIENT
Start: 2023-02-22 | End: 2023-03-01

## 2023-02-27 ENCOUNTER — PATIENT OUTREACH (OUTPATIENT)
Dept: CASE MANAGEMENT | Facility: OTHER | Age: 83
End: 2023-02-27
Payer: MEDICARE

## 2023-02-27 LAB
BACTERIA SPEC AEROBE CULT: NORMAL
BACTERIA SPEC AEROBE CULT: NORMAL

## 2023-02-27 NOTE — OUTREACH NOTE
AMBULATORY CASE MANAGEMENT NOTE    Name and Relationship of Patient/Support Person: Elmo Martinez P - Self     Patient Outreach    Patient in ED 2/22/23, dx'd with Covid-19 and heart failure. Pt reports feeling a little better today, states she has an appointment with her PCP tomorrow. Pt reports still having edema to legs. Education provided, understanding voiced. Care plan created, goals reviewed. Outreach scheduled for HRCM progression.    Adult Patient Profile  Questions/Answers    Flowsheet Row Most Recent Value   Symptoms/Conditions Managed at Home cardiovascular   Barriers to Managing Health none   Cardiovascular Symptoms/Conditions hypertension, dysrhythmia   Cardiovascular Management Strategies diet modification, routine screening, medication therapy, adequate rest   Identifying Health Goals be more active, keep illness under control   Taking Medications Not Prescribed no   Missed Doses of Prescribed Medications During Past Week no   Taken Prescribed Medications at Different Time or Schedule During Past Week no   Taken More or Less Medication Than Prescribed no   Barriers to Taking Medication as Prescribed none          SDOH updated and reviewed with the patient during this program:  Transportation Needs: No Transportation Needs   • Lack of Transportation (Medical): No   • Lack of Transportation (Non-Medical): No      Housing Stability: Low Risk    • Unable to Pay for Housing in the Last Year: No   • Number of Places Lived in the Last Year: 1   • Unstable Housing in the Last Year: No       Education Documentation  Unresolved/Worsening Symptoms, taught by Andreia Washington, RN at 2/27/2023 12:45 PM.  Learner: Patient  Readiness: Acceptance  Method: Explanation  Response: Verbalizes Understanding    Protecting Others, taught by Andreia Washington, RN at 2/27/2023 12:45 PM.  Learner: Patient  Readiness: Acceptance  Method: Explanation  Response: Verbalizes Understanding    Protecting Self, taught by Andreia Washington, RN  at 2/27/2023 12:45 PM.  Learner: Patient  Readiness: Acceptance  Method: Explanation  Response: Verbalizes Understanding    Prevention, taught by Andreia Washington, RN at 2/27/2023 12:45 PM.  Learner: Patient  Readiness: Acceptance  Method: Explanation  Response: Verbalizes Understanding    Home Instructions, taught by Andreia Washington, RN at 2/27/2023 12:45 PM.  Learner: Patient  Readiness: Acceptance  Method: Explanation  Response: Verbalizes Understanding          Andreia CENTENO  Ambulatory Case Management    2/27/2023, 13:02 EST

## 2023-03-05 NOTE — ED PROVIDER NOTES
Subjective   History of Present Illness     Elmo is a 83 yo presenting to the ED for generalized weakness, non productive cough and increased congestion for 2-3 days. Patient reports today she spike a fever, prompting her visit. Patient denies chest pain, dyspnea. No N/V/D. No abdominal pain. No covid exposures or sick contacts.     Review of Systems   Constitutional: Positive for fever. Negative for activity change.   HENT: Positive for congestion.    Respiratory: Positive for cough. Negative for shortness of breath.    Cardiovascular: Negative.  Negative for chest pain.   Gastrointestinal: Negative.  Negative for abdominal pain.   Endocrine: Negative.    Genitourinary: Negative.  Negative for dysuria.   Skin: Negative.    Neurological: Positive for weakness. Negative for dizziness and light-headedness.   Psychiatric/Behavioral: Negative.    All other systems reviewed and are negative.      Past Medical History:   Diagnosis Date   • Balance problem     lack of balance between leisure activites and work   • Coronary artery disease    • COVID    • Diabetes mellitus (HCC)     diet controlled. Checks blood sugar once a day. diagnosed 4 years   • GERD (gastroesophageal reflux disease)    • Glaucoma    • Gout    • Heart palpitations    • Hiatal hernia    • History of transfusion     1963   • Hypercholesterolemia    • Hypertension    • Low back pain    • Mitral valve stenosis    • Osteoarthritis    • Osteoporosis    • PONV (postoperative nausea and vomiting)    • Scoliosis    • Skin cancer    • Spinal headache     mild   • Wears glasses        Allergies   Allergen Reactions   • Codeine Mental Status Change   • Meperidine Mental Status Change   • Morphine Mental Status Change   • Statins Myalgia     Zocor, lipitor, welchol and pravachol   • Sulfa Antibiotics Rash   • Other Swelling     Swelling at site of insertion   • Imdur [Isosorbide Nitrate] Mental Status Change   • Influenza Virus Vaccine Swelling       Past Surgical  History:   Procedure Laterality Date   • CARDIAC CATHETERIZATION N/A 6/22/2016    Procedure: right and left heart, abnormal stress, abnormal echo revealing pulm HTN;  Surgeon: Pancho Casey MD;  Location: [facility] INVASIVE LOCATION;  Service:    • CATARACT EXTRACTION     • COLONOSCOPY      2015   • GLAUCOMA SURGERY     • HYSTERECTOMY     • TUBAL ABDOMINAL LIGATION         Family History   Problem Relation Age of Onset   • Cancer Other    • Diabetes Other    • Gout Other    • Heart disease Other    • Hypertension Other    • Osteoarthritis Other    • Osteoporosis Other    • Rheum arthritis Other    • Osteoarthritis Mother    • Cancer Mother    • Hypertension Mother    • COPD Father    • Cancer Sister    • Osteoarthritis Sister    • Hypertension Sister    • Cancer Maternal Grandfather    • Breast cancer Neg Hx        Social History     Socioeconomic History   • Marital status:    Tobacco Use   • Smoking status: Never   • Smokeless tobacco: Never   Vaping Use   • Vaping Use: Never used   Substance and Sexual Activity   • Alcohol use: No   • Drug use: No   • Sexual activity: Defer           Objective   Physical Exam  Constitutional:       Appearance: Normal appearance.   HENT:      Head: Normocephalic and atraumatic.      Right Ear: Tympanic membrane normal.      Left Ear: Tympanic membrane normal.      Nose: Nose normal. No congestion or rhinorrhea.      Mouth/Throat:      Mouth: Mucous membranes are moist.      Pharynx: No oropharyngeal exudate or posterior oropharyngeal erythema.   Eyes:      Extraocular Movements: Extraocular movements intact.      Pupils: Pupils are equal, round, and reactive to light.   Cardiovascular:      Rate and Rhythm: Normal rate and regular rhythm.   Pulmonary:      Effort: Pulmonary effort is normal. No respiratory distress.      Breath sounds: Normal breath sounds.   Abdominal:      General: Abdomen is flat. Bowel sounds are normal. There is no distension.   Musculoskeletal:          General: No swelling. Normal range of motion.      Cervical back: Normal range of motion. No rigidity or tenderness.   Skin:     General: Skin is warm.      Capillary Refill: Capillary refill takes less than 2 seconds.      Coloration: Skin is not jaundiced or pale.   Neurological:      General: No focal deficit present.      Mental Status: She is alert and oriented to person, place, and time.         Procedures           ED Course                                           Medical Decision Making  COVID-19: complicated acute illness or injury  Heart failure, unspecified HF chronicity, unspecified heart failure type (HCC): complicated acute illness or injury  Amount and/or Complexity of Data Reviewed  Labs: ordered.  Radiology: ordered.  ECG/medicine tests: ordered.      Risk  Prescription drug management.          Final diagnoses:   COVID-19   Heart failure, unspecified HF chronicity, unspecified heart failure type (HCC)       ED Disposition  ED Disposition     ED Disposition   Discharge    Condition   Stable    Comment   --             Herman Bob, PA  [address] [id number] [phone]    Go in 2 days           Medication List      ASK your doctor about these medications    doxycycline 100 MG capsule  Commonly known as: MONODOX  Take 1 capsule by mouth 2 (Two) Times a Day for 7 days.  Ask about: Should I take this medication?           Where to Get Your Medications      These medications were sent to [id number] - [place], [place] - [address] AT [facility] OF [address] - [phone] PH - [phone] FX  [address] [place] [address]    Phone: [phone]   · doxycycline 100 MG capsule          Margarette Lovelace MD  03/05/23 0036

## 2023-04-14 ENCOUNTER — PATIENT OUTREACH (OUTPATIENT)
Dept: CASE MANAGEMENT | Facility: OTHER | Age: 83
End: 2023-04-14
Payer: MEDICARE

## 2023-04-14 NOTE — OUTREACH NOTE
AMBULATORY CASE MANAGEMENT NOTE    Name and Relationship of Patient/Support Person: Elmo Martinez P - Self     Patient Outreach    Patient reports doing well, no s/s exac of disease reported. Education provided, understanding voiced. Safety/fall precautions reinforced. Pt managing blood pressure well at home. Care plan goals reviewed/updated, next outreach scheduled for HRCM progression.    Adult Patient Profile  Questions/Answers    Flowsheet Row Most Recent Value   Cardiovascular Symptoms/Conditions heart failure, hypertension, dysrhythmia   Cardiovascular Management Strategies diet modification, adequate rest, medication therapy, routine screening, fluid modification   Cardiovascular Self-Management Outcome 4 (good)   Taking Medications Not Prescribed no   Missed Doses of Prescribed Medications During Past Week no   Taken Prescribed Medications at Different Time or Schedule During Past Week no   Taken More or Less Medication Than Prescribed no   Barriers to Taking Medication as Prescribed none          SDOH updated and reviewed with the patient during this program:  Financial Resource Strain: Medium Risk   • Difficulty of Paying Living Expenses: Somewhat hard       Education Documentation  Signs/Symptoms, taught by Andreia Washington, RN at 4/14/2023 11:13 AM.  Learner: Patient  Readiness: Acceptance  Method: Explanation  Response: Verbalizes Understanding    preventive care, taught by Andreia Washington, RN at 4/14/2023 11:13 AM.  Learner: Patient  Readiness: Acceptance  Method: Explanation  Response: Verbalizes Understanding    Pulse Monitoring, taught by Andreia Washington, RN at 4/14/2023 11:13 AM.  Learner: Patient  Readiness: Acceptance  Method: Explanation  Response: Verbalizes Understanding    Provider Follow-Up, taught by Andreia Washington, RN at 4/14/2023 11:13 AM.  Learner: Patient  Readiness: Acceptance  Method: Explanation  Response: Verbalizes Understanding    Blood Pressure Monitoring, taught by Andreia Washington, RN  at 4/14/2023 11:13 AM.  Learner: Patient  Readiness: Acceptance  Method: Explanation  Response: Verbalizes Understanding          Andreia CENTENO  Ambulatory Case Management    4/14/2023, 11:14 EDT

## 2023-04-28 RX ORDER — EVOLOCUMAB 140 MG/ML
INJECTION, SOLUTION SUBCUTANEOUS
Qty: 2 ML | Refills: 11 | Status: SHIPPED | OUTPATIENT
Start: 2023-04-28 | End: 2023-04-28 | Stop reason: SDUPTHER

## 2023-04-28 RX ORDER — EVOLOCUMAB 140 MG/ML
140 INJECTION, SOLUTION SUBCUTANEOUS
Qty: 2 ML | Refills: 11 | Status: SHIPPED | OUTPATIENT
Start: 2023-04-28

## 2023-05-15 ENCOUNTER — PATIENT OUTREACH (OUTPATIENT)
Dept: CASE MANAGEMENT | Facility: OTHER | Age: 83
End: 2023-05-15
Payer: MEDICARE

## 2023-05-15 NOTE — OUTREACH NOTE
AMBULATORY CASE MANAGEMENT NOTE    Name and Relationship of Patient/Support Person: Elmo Martinez P - Self     Patient Outreach    Patient reports doing well with meds and disease mgmt; has follow up with PCP scheduled for this month on 5/24. Patient continues to experience intermittent constipation, manages with meds and increased fluids. No increase in edema/weight reported. Care plan goals reviewed/updated; next outreach scheduled for HRCM progression.    Adult Patient Profile  Questions/Answers    Flowsheet Row Most Recent Value   Cardiovascular Symptoms/Conditions hypertension, heart failure, dysrhythmia   Cardiovascular Management Strategies diet modification, adequate rest, routine screening, fluid modification   Cardiovascular Self-Management Outcome 4 (good)   Taking Medications Not Prescribed no   Missed Doses of Prescribed Medications During Past Week no   Taken Prescribed Medications at Different Time or Schedule During Past Week no   Taken More or Less Medication Than Prescribed no   Barriers to Taking Medication as Prescribed none          Education Documentation  Pulse Monitoring, taught by Andreia Washington, RN at 5/15/2023  3:38 PM.  Learner: Patient  Readiness: Acceptance  Method: Explanation, Teach Back  Response: Verbalizes Understanding    Unresolved/Worsening Symptoms, taught by Andreia Washington, RN at 5/15/2023  3:38 PM.  Learner: Patient  Readiness: Acceptance  Method: Explanation, Teach Back  Response: Verbalizes Understanding    Diet Modification, taught by Andreia Washington, RN at 5/15/2023  3:38 PM.  Learner: Patient  Readiness: Acceptance  Method: Explanation, Teach Back  Response: Verbalizes Understanding    Attention to Urge, taught by Andreia Washington, RN at 5/15/2023  3:38 PM.  Learner: Patient  Readiness: Acceptance  Method: Explanation, Teach Back  Response: Verbalizes Understanding    Self-Care, taught by Andreia Washington, RN at 5/15/2023  3:38 PM.  Learner: Patient  Readiness:  Acceptance  Method: Explanation, Teach Back  Response: Verbalizes Understanding          Andreia CENTENO  Ambulatory Case Management    5/15/2023, 15:39 EDT

## 2023-06-19 ENCOUNTER — PATIENT OUTREACH (OUTPATIENT)
Dept: CASE MANAGEMENT | Facility: OTHER | Age: 83
End: 2023-06-19
Payer: MEDICARE

## 2023-06-19 NOTE — OUTREACH NOTE
AMBULATORY CASE MANAGEMENT NOTE    Name and Relationship of Patient/Support Person: Elmo Martinez P - Self    Patient Outreach    Patient saw PCP last month, blood pressure at goal; no exac of disease reported. Pt feeling well managed at present, sees PCP again this week. Care plan completed; ACM will begin monitoring for program graduation.    Adult Patient Profile  Questions/Answers      Flowsheet Row Most Recent Value   Cardiovascular Symptoms/Conditions hypertension, dysrhythmia, heart failure   Cardiovascular Management Strategies diet modification, fluid modification, medication therapy, routine screening, adequate rest   Cardiovascular Self-Management Outcome 5 (very good)   Taking Medications Not Prescribed no   Missed Doses of Prescribed Medications During Past Week no   Taken Prescribed Medications at Different Time or Schedule During Past Week no   Taken More or Less Medication Than Prescribed no   Barriers to Taking Medication as Prescribed none            Education Documentation  Unresolved/Worsening Symptoms, taught by Andreia Washington, RN at 6/19/2023 12:39 PM.  Learner: Patient  Readiness: Acceptance  Method: Explanation, Teach Back  Response: Verbalizes Understanding    Weight Monitoring, taught by Andreia Washington, RN at 6/19/2023 12:39 PM.  Learner: Patient  Readiness: Acceptance  Method: Explanation, Teach Back  Response: Verbalizes Understanding    Self-Care, taught by Andreia Washington, RN at 6/19/2023 12:39 PM.  Learner: Patient  Readiness: Acceptance  Method: Explanation, Teach Back  Response: Verbalizes Understanding    Pulse Monitoring, taught by Andreia Washington, RN at 6/19/2023 12:39 PM.  Learner: Patient  Readiness: Acceptance  Method: Explanation, Teach Back  Response: Verbalizes Understanding    Fluid/Food Intake, taught by Andreia Washington, RN at 6/19/2023 12:39 PM.  Learner: Patient  Readiness: Acceptance  Method: Explanation, Teach Back  Response: Verbalizes Understanding    Self-Care, taught by  Andreia Washington, RN at 6/19/2023 12:39 PM.  Learner: Patient  Readiness: Acceptance  Method: Explanation, Teach Back  Response: Verbalizes Understanding          Andreia CENTENO  Ambulatory Case Management    6/19/2023, 12:39 EDT

## 2023-07-15 ENCOUNTER — LAB REQUISITION (OUTPATIENT)
Dept: LAB | Facility: HOSPITAL | Age: 83
End: 2023-07-15
Payer: MEDICARE

## 2023-07-15 DIAGNOSIS — B97.81 HUMAN METAPNEUMOVIRUS AS THE CAUSE OF DISEASES CLASSIFIED ELSEWHERE: ICD-10-CM

## 2023-07-15 PROCEDURE — 0202U NFCT DS 22 TRGT SARS-COV-2: CPT | Performed by: NURSE PRACTITIONER

## 2023-07-20 ENCOUNTER — PATIENT OUTREACH (OUTPATIENT)
Dept: CASE MANAGEMENT | Facility: OTHER | Age: 83
End: 2023-07-20

## 2023-07-20 NOTE — OUTREACH NOTE
AMBULATORY CASE MANAGEMENT NOTE    Name and Relationship of Patient/Support Person:  -     Care Coordination    Per chart review ([employer]), patient experienced a fall with fracture to L shoulder and is now at [facility] for rehab.     SNF Follow-up    Questions/Answers      Flowsheet Row Responses   Acute Facility Discharged From none   Name of the Skilled Nursing Facility? [facility]   Purpose of SNF Admission PT, OT   Who is the insurance provider or payor of patient stay? Medicare   Progression of Patient? Patient admitted to [facility] for rehab,  ACM monitoring for discharge or status change.                Andreia CENTENO  Ambulatory Case Management    7/20/2023, 12:14 EDT

## 2023-07-28 ENCOUNTER — LAB REQUISITION (OUTPATIENT)
Dept: LAB | Facility: HOSPITAL | Age: 83
End: 2023-07-28
Payer: COMMERCIAL

## 2023-07-28 DIAGNOSIS — R26.2 DIFFICULTY IN WALKING, NOT ELSEWHERE CLASSIFIED: ICD-10-CM

## 2023-07-28 LAB — URATE SERPL-MCNC: 6.2 MG/DL (ref 2.4–5.7)

## 2023-07-28 PROCEDURE — 84550 ASSAY OF BLOOD/URIC ACID: CPT | Performed by: INTERNAL MEDICINE

## 2023-08-04 ENCOUNTER — LAB REQUISITION (OUTPATIENT)
Dept: LAB | Facility: HOSPITAL | Age: 83
End: 2023-08-04
Payer: COMMERCIAL

## 2023-08-04 DIAGNOSIS — R30.0 DYSURIA: ICD-10-CM

## 2023-08-04 LAB
BILIRUB UR QL STRIP: NEGATIVE
CLARITY UR: CLEAR
COLOR UR: YELLOW
GLUCOSE UR STRIP-MCNC: NEGATIVE MG/DL
HGB UR QL STRIP.AUTO: NEGATIVE
KETONES UR QL STRIP: NEGATIVE
LEUKOCYTE ESTERASE UR QL STRIP.AUTO: NEGATIVE
NITRITE UR QL STRIP: NEGATIVE
PH UR STRIP.AUTO: 6.5 [PH] (ref 5–8)
PROT UR QL STRIP: NEGATIVE
SP GR UR STRIP: <=1.005 (ref 1–1.03)
UROBILINOGEN UR QL STRIP: NORMAL

## 2023-08-04 PROCEDURE — 81003 URINALYSIS AUTO W/O SCOPE: CPT | Performed by: INTERNAL MEDICINE

## 2023-08-08 DIAGNOSIS — M25.562 LEFT KNEE PAIN, UNSPECIFIED CHRONICITY: Primary | ICD-10-CM

## 2023-11-06 NOTE — PROGRESS NOTES
"[facility] Cardiology    Encounter Date: 2023    Patient ID: Elmo Martinez is a 83 y.o. female.  : 1940     PCP: Herman Bob PA       Chief Complaint: Atrial fibrillation, unspecified type    PROBLEM LIST:  Atrial fibrillation  2 week monitor 2021: SR with occasional PACs with short runs of SVT vs possible Afib   Echo 2021: EF 66-70%, mild AS, moderate MR, mild MS, elevated RVSP  ECG 23 - atrial fibrillation, ventricular rate 85bpm  Chest pain  Echocardiogram with LVEF >65%, mild LVH 3/29/13  Normal regadenoson cardiolite stress with LVEF 76% 3/29/13  Echocardiogram LVEF 60%-65%, mod MR, mild to mod TR and evidence severe pulmonary HTN 16  Abnormal lexiscan nuclear stress 16  [facility], 2016:  Normal LV function, normal coronaries, normal pulmonary artery pressures.  Echo, 2018: \"severe PA hypertension\" and mild MS\"  Hypertension  Dyslipidemia, intolerant to statins secondary to myalgias and elevated CPK   Well controled on PSK9 inhibitor  Diabetes Mellitus with neuropathy  Carotid artery disease  Carotid duplex 2020: LICA 50-69% stenosis, asymptomatic   GERD and hiatal hernia  Osteoarthritis  Skin cancer  Glaucoma  History of gout  Scoliosis  Small stomach ulcers  Surgical history: hysterectomy, glaucoma, appendectomy       History of Present Illness  Elmo Martinez is a 83 y.o. with a history of atrial fibrillation and cardiac risk factors.  She was last seen by Dr. Amezquita in 2022.  Since that time she has had a fall and left proximal humerus fracture treated conservatively and she had completed inpatient rehab after admission has since moved home.  Her son and daughter-in-law are moving in with her for assistance and she has 3 more sessions of outpatient physical therapy.  Currently, she does not have chest pain or dyspnea on exertion and does not have palpitations.  She does have leg swelling which she manages with elevation " and diuretics.      Allergies   Allergen Reactions    Codeine Mental Status Change    Meperidine Mental Status Change    Morphine Mental Status Change    Statins Myalgia     Zocor, lipitor, welchol and pravachol    Sulfa Antibiotics Rash    Other Swelling     Swelling at site of insertion    Imdur [Isosorbide Nitrate] Mental Status Change    Influenza Virus Vaccine Swelling         Current Outpatient Medications:     allopurinol (ZYLOPRIM) 300 MG tablet, Take 1 tablet by mouth As Needed., Disp: , Rfl:     ALPRAZolam (XANAX) 0.5 MG tablet, Take 1 tablet by mouth 2 (Two) Times a Day As Needed. Takes 0.25 prn, Disp: , Rfl:     apixaban (ELIQUIS) 5 MG tablet tablet, Take 1 tablet by mouth Every 12 (Twelve) Hours., Disp: 180 tablet, Rfl: 3    aspirin 81 MG tablet, Take 1 tablet by mouth Daily., Disp: , Rfl:     brimonidine (ALPHAGAN) 0.2 % ophthalmic solution, 1 drop 2 (Two) Times a Day., Disp: , Rfl:     bumetanide (BUMEX) 1 MG tablet, Take 1 tablet by mouth Daily., Disp: , Rfl:     Diclofenac Sodium (VOLTAREN) 1 % gel gel, Apply 4 g topically to the appropriate area as directed 4 (Four) Times a Day As Needed., Disp: , Rfl:     dilTIAZem CD (CARDIZEM CD) 180 MG 24 hr capsule, Take 1 capsule by mouth Daily., Disp: , Rfl:     dorzolamide-timolol (COSOPT) 22.3-6.8 MG/ML ophthalmic solution, Administer 1 drop to both eyes 2 (Two) Times a Day., Disp: , Rfl:     Evolocumab (Repatha SureClick) solution auto-injector SureClick injection, Inject 1 mL under the skin into the appropriate area as directed Every 14 (Fourteen) Days., Disp: 2 mL, Rfl: 11    FIBER PO, Take  by mouth Daily., Disp: , Rfl:     gabapentin (NEURONTIN) 800 MG tablet, Take 1 tablet by mouth 2 (Two) Times a Day., Disp: , Rfl:     HYDROcodone-acetaminophen (NORCO) 7.5-325 MG per tablet, Take 1 tablet by mouth Every 12 (Twelve) Hours As Needed for Moderate Pain (right shoulder pain)., Disp: , Rfl:     lisinopril (PRINIVIL,ZESTRIL) 20 MG tablet, Take 1 tablet by  "mouth Daily., Disp: , Rfl:     meclizine (ANTIVERT) 25 MG tablet, Take 1 tablet by mouth 3 (Three) Times a Day As Needed for Dizziness., Disp: , Rfl:     metoprolol tartrate (LOPRESSOR) 100 MG tablet, Take 1 tablet by mouth 2 (Two) Times a Day., Disp: 180 tablet, Rfl: 3    nitroglycerin (NITROSTAT) 0.4 MG SL tablet, Place 1 tablet under the tongue Every 5 (Five) Minutes As Needed for Chest Pain. Take no more than 3 doses in 15 minutes., Disp: , Rfl:     ondansetron (ZOFRAN) 4 MG tablet, Take 1 tablet by mouth Every 8 (Eight) Hours As Needed for Nausea or Vomiting., Disp: , Rfl:     pantoprazole (PROTONIX) 40 MG EC tablet, Take 1 tablet by mouth Daily., Disp: , Rfl:     Polyethylene Glycol 1000 powder, As Needed., Disp: , Rfl:     timolol (TIMOPTIC) 0.5 % ophthalmic solution, Administer 1 drop to both eyes 2 (Two) Times a Day., Disp: , Rfl:     The following portions of the patient's history were reviewed and updated as appropriate: allergies, current medications, past family history, past medical history, past social history, past surgical history and problem list.    Review of Systems   Cardiovascular:  Positive for irregular heartbeat and leg swelling. Negative for chest pain and dyspnea on exertion.   Musculoskeletal:  Positive for arthritis, back pain and falls.             Objective:     Visit Vitals  /76 (BP Location: Right arm, Patient Position: Sitting)   Pulse 71   Ht 157.5 cm (62\")   Wt 81 kg (178 lb 9.6 oz)   LMP  (LMP Unknown)   SpO2 96%   BMI 32.67 kg/m²   Physical Exam  Constitutional: Older white female, ambulating with rolling walker  HENT: HEENT exam unremarkable.   Neck: Neck supple. No JVD present. No carotid bruits.   Cardiovascular: Normal rate, irregularly irregular, distant heart sounds, no murmur heard.   2+ symmetric pulses.   Pulmonary/Chest: Breath sounds normal. Does not exhibit tenderness.   Abdominal: Abdomen benign.   Musculoskeletal: 1+ bilateral LE edema.   Neurological: " Neurological exam unremarkable.     Data Review:     Lab Results   Component Value Date    WBC 14.98 (H) 07/13/2023    HGB 11.1 (L) 07/13/2023    HCT 37.4 07/13/2023    MCV 81.3 07/13/2023     07/13/2023      Lab Results   Component Value Date    GLUCOSE 129 (H) 07/13/2023    BUN 14 07/13/2023    CREATININE 0.84 07/13/2023    EGFR 69.9 07/13/2023    BCR 16.7 07/13/2023    K 4.5 07/13/2023    CO2 27.8 07/13/2023    CALCIUM 9.2 07/13/2023    ALBUMIN 3.8 07/13/2023    BILITOT 0.6 07/13/2023    AST 21 07/13/2023    ALT 13 07/13/2023      Lab date: 06/16/2022  FLP: , , HDL 56, LDL 27  CMP: Glu 102, BUN 8, Creat 0.85, eGFR 69, Na 141, K 5.1, Cl 101, CO2 26, Ca 9.4, Alk Phos 87, AST 11, ALT 8  CBC: WBC 10.2, RBC 5.27, HGB 12.6, HCT 42.2, MCV 80, MCH 23.9,       Procedures     Advance Care Planning   ACP discussion was declined by the patient. Patient has an advance directive in EMR which is still valid.            Assessment:     Diagnosis Plan   Atrial fibrillation, unspecified type No recurring palpitations. Continue on Eliquis 5 mg BID, aspirin 81 mg, and diltiazem 180, metoprolol 100 mg.      Essential hypertension  Well controlled.  Continue current medications     Mixed hyperlipidemia  Well controlled. Continue on repatha 140 mg.        Plan:   Stable cardiac status.   Continue all other current medications.   We will investigate potential patient assistance plans for PSK 9 inhibitor and anticoagulation.  If unable to get a better price for Eliquis is possible that we could change her to rivaroxaban which should Or cost $80 per month if obtained directly from a fracture  No follow-ups on file.   Thank you for allowing us to participate in the care of your patient.     MARIA EUGENIA Hagan MD  11/07/23 10:35 EST

## 2023-11-07 ENCOUNTER — OFFICE VISIT (OUTPATIENT)
Dept: CARDIOLOGY | Facility: CLINIC | Age: 83
End: 2023-11-07
Payer: MEDICARE

## 2023-11-07 VITALS
SYSTOLIC BLOOD PRESSURE: 120 MMHG | OXYGEN SATURATION: 96 % | HEART RATE: 71 BPM | BODY MASS INDEX: 32.87 KG/M2 | WEIGHT: 178.6 LBS | DIASTOLIC BLOOD PRESSURE: 76 MMHG | HEIGHT: 62 IN

## 2023-11-07 DIAGNOSIS — I10 PRIMARY HYPERTENSION: ICD-10-CM

## 2023-11-07 DIAGNOSIS — I48.11 LONGSTANDING PERSISTENT ATRIAL FIBRILLATION: Primary | ICD-10-CM

## 2023-11-07 DIAGNOSIS — I05.9 MITRAL VALVE DISORDER: ICD-10-CM

## 2023-11-07 PROCEDURE — 99214 OFFICE O/P EST MOD 30 MIN: CPT | Performed by: INTERNAL MEDICINE

## 2023-11-07 RX ORDER — EVOLOCUMAB 140 MG/ML
140 INJECTION, SOLUTION SUBCUTANEOUS
Qty: 2 ML | Refills: 11 | Status: SHIPPED | OUTPATIENT
Start: 2023-11-07

## 2023-11-07 RX ORDER — BUMETANIDE 1 MG/1
1 TABLET ORAL DAILY
COMMUNITY
Start: 2023-08-21

## 2023-11-07 RX ORDER — DILTIAZEM HYDROCHLORIDE 180 MG/1
1 CAPSULE, COATED, EXTENDED RELEASE ORAL DAILY
COMMUNITY
Start: 2023-09-11

## 2023-11-14 ENCOUNTER — TELEPHONE (OUTPATIENT)
Dept: CARDIOLOGY | Facility: CLINIC | Age: 83
End: 2023-11-14
Payer: MEDICARE

## 2023-11-14 NOTE — TELEPHONE ENCOUNTER
Caller: HEMANT SULLIVAN    Relationship: Other Relative    Best call back number: [phone]     What is the best time to reach you: ANY     Who are you requesting to speak with (clinical staff, provider,  specific staff member): CLINICAL     What was the call regarding: PT IS JUST NEEDING TO MAKE SURE SHE CAN COME  HER ELIQUIS SAMPLES FROM THE [place] OFFICE TODAY. SHE WOULDN'T GET THERE FOR ANOTHER 1 OR HOUR AND A HALF. PT WILL BE SENDING HER SISTER IN TO GET THE MEDICATION. PLEASE REACH OUT TO FURTHER ADVISE. THANK YOU!

## 2024-03-11 RX ORDER — METOPROLOL TARTRATE 100 MG/1
100 TABLET ORAL 2 TIMES DAILY
Qty: 180 TABLET | Refills: 3 | Status: SHIPPED | OUTPATIENT
Start: 2024-03-11

## 2024-03-21 ENCOUNTER — LAB REQUISITION (OUTPATIENT)
Dept: LAB | Facility: HOSPITAL | Age: 84
End: 2024-03-21
Payer: MEDICARE

## 2024-03-21 DIAGNOSIS — G47.51 CONFUSIONAL AROUSALS: ICD-10-CM

## 2024-03-21 LAB
ALBUMIN SERPL-MCNC: 3.6 G/DL (ref 3.5–5.2)
ALBUMIN/GLOB SERPL: 1.3 G/DL
ALP SERPL-CCNC: 89 U/L (ref 39–117)
ALT SERPL W P-5'-P-CCNC: 17 U/L (ref 1–33)
ANION GAP SERPL CALCULATED.3IONS-SCNC: 12.8 MMOL/L (ref 5–15)
AST SERPL-CCNC: 16 U/L (ref 1–32)
BACTERIA UR QL AUTO: NORMAL /HPF
BASOPHILS # BLD AUTO: 0.13 10*3/MM3 (ref 0–0.2)
BASOPHILS NFR BLD AUTO: 0.7 % (ref 0–1.5)
BILIRUB SERPL-MCNC: 0.9 MG/DL (ref 0–1.2)
BILIRUB UR QL STRIP: NEGATIVE
BUN SERPL-MCNC: 13 MG/DL (ref 8–23)
BUN/CREAT SERPL: 26 (ref 7–25)
CALCIUM SPEC-SCNC: 9.3 MG/DL (ref 8.6–10.5)
CHLORIDE SERPL-SCNC: 91 MMOL/L (ref 98–107)
CLARITY UR: CLEAR
CO2 SERPL-SCNC: 35.2 MMOL/L (ref 22–29)
COLOR UR: YELLOW
CREAT SERPL-MCNC: 0.5 MG/DL (ref 0.57–1)
DEPRECATED RDW RBC AUTO: 51.8 FL (ref 37–54)
EGFRCR SERPLBLD CKD-EPI 2021: 93.8 ML/MIN/1.73
EOSINOPHIL # BLD AUTO: 0.21 10*3/MM3 (ref 0–0.4)
EOSINOPHIL NFR BLD AUTO: 1.1 % (ref 0.3–6.2)
ERYTHROCYTE [DISTWIDTH] IN BLOOD BY AUTOMATED COUNT: 18.2 % (ref 12.3–15.4)
GLOBULIN UR ELPH-MCNC: 2.7 GM/DL
GLUCOSE SERPL-MCNC: 124 MG/DL (ref 65–99)
GLUCOSE UR STRIP-MCNC: NEGATIVE MG/DL
HCT VFR BLD AUTO: 44.1 % (ref 34–46.6)
HGB BLD-MCNC: 13.1 G/DL (ref 12–15.9)
HGB UR QL STRIP.AUTO: NEGATIVE
HYALINE CASTS UR QL AUTO: NORMAL /LPF
IMM GRANULOCYTES # BLD AUTO: 0.18 10*3/MM3 (ref 0–0.05)
IMM GRANULOCYTES NFR BLD AUTO: 1 % (ref 0–0.5)
KETONES UR QL STRIP: NEGATIVE
LEUKOCYTE ESTERASE UR QL STRIP.AUTO: NEGATIVE
LYMPHOCYTES # BLD AUTO: 1.91 10*3/MM3 (ref 0.7–3.1)
LYMPHOCYTES NFR BLD AUTO: 10.4 % (ref 19.6–45.3)
MCH RBC QN AUTO: 24.4 PG (ref 26.6–33)
MCHC RBC AUTO-ENTMCNC: 29.7 G/DL (ref 31.5–35.7)
MCV RBC AUTO: 82.1 FL (ref 79–97)
MONOCYTES # BLD AUTO: 2.49 10*3/MM3 (ref 0.1–0.9)
MONOCYTES NFR BLD AUTO: 13.6 % (ref 5–12)
NEUTROPHILS NFR BLD AUTO: 13.38 10*3/MM3 (ref 1.7–7)
NEUTROPHILS NFR BLD AUTO: 73.2 % (ref 42.7–76)
NITRITE UR QL STRIP: NEGATIVE
NRBC BLD AUTO-RTO: 0 /100 WBC (ref 0–0.2)
PH UR STRIP.AUTO: 6.5 [PH] (ref 5–8)
PLATELET # BLD AUTO: 290 10*3/MM3 (ref 140–450)
PMV BLD AUTO: 12.6 FL (ref 6–12)
POTASSIUM SERPL-SCNC: 3.2 MMOL/L (ref 3.5–5.2)
PROT SERPL-MCNC: 6.3 G/DL (ref 6–8.5)
PROT UR QL STRIP: NEGATIVE
RBC # BLD AUTO: 5.37 10*6/MM3 (ref 3.77–5.28)
RBC # UR STRIP: NORMAL /HPF
REF LAB TEST METHOD: NORMAL
SODIUM SERPL-SCNC: 139 MMOL/L (ref 136–145)
SP GR UR STRIP: 1.01 (ref 1–1.03)
SQUAMOUS #/AREA URNS HPF: NORMAL /HPF
TSH SERPL DL<=0.05 MIU/L-ACNC: 3.94 UIU/ML (ref 0.27–4.2)
UROBILINOGEN UR QL STRIP: NORMAL
WBC # UR STRIP: NORMAL /HPF
WBC NRBC COR # BLD AUTO: 18.3 10*3/MM3 (ref 3.4–10.8)

## 2024-03-21 PROCEDURE — 86592 SYPHILIS TEST NON-TREP QUAL: CPT | Performed by: NURSE PRACTITIONER

## 2024-03-21 PROCEDURE — 84443 ASSAY THYROID STIM HORMONE: CPT | Performed by: NURSE PRACTITIONER

## 2024-03-21 PROCEDURE — 81001 URINALYSIS AUTO W/SCOPE: CPT | Performed by: NURSE PRACTITIONER

## 2024-03-21 PROCEDURE — 80053 COMPREHEN METABOLIC PANEL: CPT | Performed by: NURSE PRACTITIONER

## 2024-03-21 PROCEDURE — 85025 COMPLETE CBC W/AUTO DIFF WBC: CPT | Performed by: NURSE PRACTITIONER

## 2024-03-21 PROCEDURE — 82607 VITAMIN B-12: CPT | Performed by: NURSE PRACTITIONER

## 2024-03-21 PROCEDURE — 84134 ASSAY OF PREALBUMIN: CPT | Performed by: NURSE PRACTITIONER

## 2024-03-21 PROCEDURE — 84436 ASSAY OF TOTAL THYROXINE: CPT | Performed by: NURSE PRACTITIONER

## 2024-03-21 PROCEDURE — 82746 ASSAY OF FOLIC ACID SERUM: CPT | Performed by: NURSE PRACTITIONER

## 2024-03-22 ENCOUNTER — LAB REQUISITION (OUTPATIENT)
Dept: LAB | Facility: HOSPITAL | Age: 84
End: 2024-03-22
Payer: MEDICARE

## 2024-03-22 DIAGNOSIS — I10 ESSENTIAL (PRIMARY) HYPERTENSION: ICD-10-CM

## 2024-03-22 LAB
BASOPHILS # BLD AUTO: 0.16 10*3/MM3 (ref 0–0.2)
BASOPHILS NFR BLD AUTO: 0.9 % (ref 0–1.5)
DEPRECATED RDW RBC AUTO: 52.1 FL (ref 37–54)
EOSINOPHIL # BLD AUTO: 0.26 10*3/MM3 (ref 0–0.4)
EOSINOPHIL NFR BLD AUTO: 1.5 % (ref 0.3–6.2)
ERYTHROCYTE [DISTWIDTH] IN BLOOD BY AUTOMATED COUNT: 18.6 % (ref 12.3–15.4)
FOLATE SERPL-MCNC: 7.93 NG/ML (ref 4.78–24.2)
HCT VFR BLD AUTO: 45.6 % (ref 34–46.6)
HGB BLD-MCNC: 13.5 G/DL (ref 12–15.9)
IMM GRANULOCYTES # BLD AUTO: 0.17 10*3/MM3 (ref 0–0.05)
IMM GRANULOCYTES NFR BLD AUTO: 1 % (ref 0–0.5)
LYMPHOCYTES # BLD AUTO: 1.46 10*3/MM3 (ref 0.7–3.1)
LYMPHOCYTES NFR BLD AUTO: 8.4 % (ref 19.6–45.3)
MCH RBC QN AUTO: 24.4 PG (ref 26.6–33)
MCHC RBC AUTO-ENTMCNC: 29.6 G/DL (ref 31.5–35.7)
MCV RBC AUTO: 82.3 FL (ref 79–97)
MONOCYTES # BLD AUTO: 2.02 10*3/MM3 (ref 0.1–0.9)
MONOCYTES NFR BLD AUTO: 11.7 % (ref 5–12)
NEUTROPHILS NFR BLD AUTO: 13.23 10*3/MM3 (ref 1.7–7)
NEUTROPHILS NFR BLD AUTO: 76.5 % (ref 42.7–76)
NRBC BLD AUTO-RTO: 0 /100 WBC (ref 0–0.2)
PLATELET # BLD AUTO: 286 10*3/MM3 (ref 140–450)
PMV BLD AUTO: 11.9 FL (ref 6–12)
PREALB SERPL-MCNC: 9.9 MG/DL (ref 20–40)
RBC # BLD AUTO: 5.54 10*6/MM3 (ref 3.77–5.28)
RPR SER QL: NORMAL
T4 SERPL-MCNC: 9.05 MCG/DL (ref 4.5–11.7)
VIT B12 BLD-MCNC: 423 PG/ML (ref 211–946)
WBC NRBC COR # BLD AUTO: 17.3 10*3/MM3 (ref 3.4–10.8)

## 2024-03-22 PROCEDURE — 85025 COMPLETE CBC W/AUTO DIFF WBC: CPT | Performed by: INTERNAL MEDICINE

## 2024-03-25 ENCOUNTER — LAB REQUISITION (OUTPATIENT)
Dept: LAB | Facility: HOSPITAL | Age: 84
End: 2024-03-25
Payer: MEDICARE

## 2024-03-25 DIAGNOSIS — D64.9 ANEMIA, UNSPECIFIED: ICD-10-CM

## 2024-03-25 LAB
BASOPHILS # BLD AUTO: 0.14 10*3/MM3 (ref 0–0.2)
BASOPHILS NFR BLD AUTO: 0.9 % (ref 0–1.5)
DEPRECATED RDW RBC AUTO: 54.4 FL (ref 37–54)
EOSINOPHIL # BLD AUTO: 0.19 10*3/MM3 (ref 0–0.4)
EOSINOPHIL NFR BLD AUTO: 1.3 % (ref 0.3–6.2)
ERYTHROCYTE [DISTWIDTH] IN BLOOD BY AUTOMATED COUNT: 18.8 % (ref 12.3–15.4)
HCT VFR BLD AUTO: 44.2 % (ref 34–46.6)
HGB BLD-MCNC: 12.8 G/DL (ref 12–15.9)
IMM GRANULOCYTES # BLD AUTO: 0.13 10*3/MM3 (ref 0–0.05)
IMM GRANULOCYTES NFR BLD AUTO: 0.9 % (ref 0–0.5)
LYMPHOCYTES # BLD AUTO: 1.9 10*3/MM3 (ref 0.7–3.1)
LYMPHOCYTES NFR BLD AUTO: 12.7 % (ref 19.6–45.3)
MCH RBC QN AUTO: 24.1 PG (ref 26.6–33)
MCHC RBC AUTO-ENTMCNC: 29 G/DL (ref 31.5–35.7)
MCV RBC AUTO: 83.2 FL (ref 79–97)
MONOCYTES # BLD AUTO: 1.71 10*3/MM3 (ref 0.1–0.9)
MONOCYTES NFR BLD AUTO: 11.4 % (ref 5–12)
NEUTROPHILS NFR BLD AUTO: 10.89 10*3/MM3 (ref 1.7–7)
NEUTROPHILS NFR BLD AUTO: 72.8 % (ref 42.7–76)
NRBC BLD AUTO-RTO: 0 /100 WBC (ref 0–0.2)
PLATELET # BLD AUTO: 273 10*3/MM3 (ref 140–450)
PMV BLD AUTO: 12.5 FL (ref 6–12)
RBC # BLD AUTO: 5.31 10*6/MM3 (ref 3.77–5.28)
WBC NRBC COR # BLD AUTO: 14.96 10*3/MM3 (ref 3.4–10.8)

## 2024-03-25 PROCEDURE — 85025 COMPLETE CBC W/AUTO DIFF WBC: CPT | Performed by: INTERNAL MEDICINE

## 2024-03-26 LAB — REF LAB TEST METHOD: NORMAL

## 2024-04-10 ENCOUNTER — LAB REQUISITION (OUTPATIENT)
Dept: LAB | Facility: HOSPITAL | Age: 84
End: 2024-04-10
Payer: MEDICARE

## 2024-04-10 DIAGNOSIS — I10 ESSENTIAL (PRIMARY) HYPERTENSION: ICD-10-CM

## 2024-04-10 LAB
BACTERIA UR QL AUTO: ABNORMAL /HPF
BILIRUB UR QL STRIP: NEGATIVE
CLARITY UR: ABNORMAL
COLOR UR: YELLOW
GLUCOSE UR STRIP-MCNC: NEGATIVE MG/DL
HGB UR QL STRIP.AUTO: NEGATIVE
HYALINE CASTS UR QL AUTO: ABNORMAL /LPF
KETONES UR QL STRIP: NEGATIVE
LEUKOCYTE ESTERASE UR QL STRIP.AUTO: NEGATIVE
NITRITE UR QL STRIP: NEGATIVE
PH UR STRIP.AUTO: 5.5 [PH] (ref 5–8)
PROT UR QL STRIP: NEGATIVE
RBC # UR STRIP: ABNORMAL /HPF
REF LAB TEST METHOD: ABNORMAL
SP GR UR STRIP: 1.01 (ref 1–1.03)
SQUAMOUS #/AREA URNS HPF: ABNORMAL /HPF
UROBILINOGEN UR QL STRIP: ABNORMAL
WBC # UR STRIP: ABNORMAL /HPF

## 2024-04-10 PROCEDURE — 87077 CULTURE AEROBIC IDENTIFY: CPT | Performed by: NURSE PRACTITIONER

## 2024-04-10 PROCEDURE — 87086 URINE CULTURE/COLONY COUNT: CPT | Performed by: NURSE PRACTITIONER

## 2024-04-10 PROCEDURE — 81001 URINALYSIS AUTO W/SCOPE: CPT | Performed by: NURSE PRACTITIONER

## 2024-04-10 PROCEDURE — 87186 SC STD MICRODIL/AGAR DIL: CPT | Performed by: NURSE PRACTITIONER

## 2024-04-12 LAB — BACTERIA SPEC AEROBE CULT: ABNORMAL

## 2024-07-22 ENCOUNTER — HOSPITAL ENCOUNTER (EMERGENCY)
Facility: HOSPITAL | Age: 84
Discharge: HOME | End: 2024-07-22
Attending: STUDENT IN AN ORGANIZED HEALTH CARE EDUCATION/TRAINING PROGRAM
Payer: MEDICARE

## 2024-07-22 VITALS
DIASTOLIC BLOOD PRESSURE: 80 MMHG | SYSTOLIC BLOOD PRESSURE: 129 MMHG | HEART RATE: 89 BPM | RESPIRATION RATE: 20 BRPM | TEMPERATURE: 97.9 F | BODY MASS INDEX: 33.27 KG/M2 | WEIGHT: 180.78 LBS | HEIGHT: 62 IN | OXYGEN SATURATION: 100 %

## 2024-07-22 DIAGNOSIS — R23.8 SKIN BULLA: Primary | ICD-10-CM

## 2024-07-22 PROCEDURE — 99282 EMERGENCY DEPT VISIT SF MDM: CPT

## 2024-07-22 RX ADMIN — MUPIROCIN 1 APPLICATION: 20 OINTMENT TOPICAL at 15:40

## 2024-07-22 NOTE — ED PROVIDER NOTES
Subjective   History of Present Illness  83-year-old female with past medical history of skin cancer, scoliosis, osteoporosis, osteoarthritis, mitral valve stenosis, hypertension, hypercholesterolemia, anemia, gout, glaucoma, GERD, controlled diabetes, COVID-19, coronary artery disease, and referral neuropathy presents to the emergency room with blistering to her fourth and fifth toe on her right foot.  States 1 week ago she hit her 2 toes on her right foot on a chair or something and states the following day noticed blisters forming.  She states on the following Wednesday she followed up with her primary care physician at [facility] and was told to let the blisters rupture on their own and placed her on doxycycline for prophylactic treatment.  Patient denies any redness, draining, pain, fevers, or chills.  She does state that she follows with a local podiatrist, Dr. Bella Casey at [facility] and could not get an appointment until this Thursday however was concerned because she takes Eliquis and noticed red particles in her fourth toe in the fluid.  Denies any specific aggravating or alleviating factors and states that she would just like a second opinion.  Denies any other complaints or concerns at this time.    History provided by:  Patient   used: No        Review of Systems   Constitutional: Negative.  Negative for fever.   HENT: Negative.     Respiratory: Negative.     Cardiovascular: Negative.  Negative for chest pain.   Gastrointestinal: Negative.  Negative for abdominal pain.   Endocrine: Negative.    Genitourinary: Negative.  Negative for dysuria.   Skin: Negative.         (+) right toe blistering   Neurological: Negative.    Psychiatric/Behavioral: Negative.     All other systems reviewed and are negative.      Past Medical History:   Diagnosis Date    Balance problem     lack of balance between leisure activites and work    Coronary artery disease     COVID     Diabetes  mellitus     diet controlled. Checks blood sugar once a day. diagnosed 4 years    GERD (gastroesophageal reflux disease)     Glaucoma     Gout     Heart palpitations     Hiatal hernia     History of transfusion     1963    Hypercholesterolemia     Hypertension     Low back pain     Mitral valve stenosis     Osteoarthritis     Osteoporosis     PONV (postoperative nausea and vomiting)     Scoliosis     Skin cancer     Spinal headache     mild    Wears glasses        Allergies   Allergen Reactions    Codeine Mental Status Change    Meperidine Mental Status Change    Morphine Mental Status Change    Statins Myalgia     Zocor, lipitor, welchol and pravachol    Sulfa Antibiotics Rash    Other Swelling     Swelling at site of insertion    Imdur [Isosorbide Nitrate] Mental Status Change    Influenza Virus Vaccine Swelling       Past Surgical History:   Procedure Laterality Date    CARDIAC CATHETERIZATION N/A 6/22/2016    Procedure: right and left heart, abnormal stress, abnormal echo revealing pulm HTN;  Surgeon: Pancho Casey MD;  Location: [facility] INVASIVE LOCATION;  Service:     CATARACT EXTRACTION      COLONOSCOPY      2015    GLAUCOMA SURGERY      HYSTERECTOMY      TUBAL ABDOMINAL LIGATION         Family History   Problem Relation Age of Onset    Cancer Other     Diabetes Other     Gout Other     Heart disease Other     Hypertension Other     Osteoarthritis Other     Osteoporosis Other     Rheum arthritis Other     Osteoarthritis Mother     Cancer Mother     Hypertension Mother     COPD Father     Cancer Sister     Osteoarthritis Sister     Hypertension Sister     Cancer Maternal Grandfather     Breast cancer Neg Hx        Social History     Socioeconomic History    Marital status:    Tobacco Use    Smoking status: Never    Smokeless tobacco: Never   Vaping Use    Vaping status: Never Used   Substance and Sexual Activity    Alcohol use: No    Drug use: No    Sexual activity: Defer           Objective    Physical Exam  Vitals and nursing note reviewed.   Constitutional:       General: She is not in acute distress.     Appearance: She is well-developed. She is not diaphoretic.   HENT:      Head: Normocephalic and atraumatic.      Right Ear: External ear normal.      Left Ear: External ear normal.      Nose: Nose normal.   Eyes:      Conjunctiva/sclera: Conjunctivae normal.      Pupils: Pupils are equal, round, and reactive to light.   Neck:      Vascular: No JVD.      Trachea: No tracheal deviation.   Cardiovascular:      Rate and Rhythm: Normal rate and regular rhythm.      Heart sounds: Murmur heard.   Pulmonary:      Effort: Pulmonary effort is normal. No respiratory distress.      Breath sounds: Normal breath sounds. No wheezing.   Abdominal:      General: Bowel sounds are normal.      Palpations: Abdomen is soft.      Tenderness: There is no abdominal tenderness.   Musculoskeletal:         General: No deformity. Normal range of motion.      Cervical back: Normal range of motion and neck supple.        Feet:    Feet:      Right foot:      Skin integrity: Blister present. No ulcer, skin breakdown, erythema, warmth, callus or dry skin.      Left foot:      Skin integrity: Skin integrity normal. No ulcer, blister, skin breakdown, erythema, warmth, callus or dry skin.   Skin:     General: Skin is warm and dry.      Coloration: Skin is not pale.      Findings: No erythema or rash.   Neurological:      Mental Status: She is alert and oriented to person, place, and time.      Cranial Nerves: No cranial nerve deficit.   Psychiatric:         Behavior: Behavior normal.         Thought Content: Thought content normal.         Incision & Drainage    Date/Time: 7/22/2024 3:43 PM    Performed by: Filomena Bradford PA-C  Authorized by: Justen Moore DO    Consent:     Consent obtained:  Verbal    Consent given by:  Patient    Risks, benefits, and alternatives were discussed: yes      Risks discussed:  Bleeding, damage  to other organs, incomplete drainage, infection and pain    Alternatives discussed:  No treatment, delayed treatment, alternative treatment, observation and referral  Universal protocol:     Procedure explained and questions answered to patient or proxy's satisfaction: yes      Relevant documents present and verified: yes      Test results available : yes      Imaging studies available: yes      Required blood products, implants, devices, and special equipment available: yes      Site/side marked: yes      Immediately prior to procedure, a time out was called: yes      Patient identity confirmed:  Verbally with patient, arm band, provided demographic data and hospital-assigned identification number  Location:     Type:  Bulla    Location:  Lower extremity    Lower extremity location:  Toe    Toe location:  R fourth toe  Pre-procedure details:     Skin preparation:  Chlorhexidine with alcohol and povidone-iodine  Sedation:     Sedation type:  None  Anesthesia:     Anesthesia method:  None  Procedure type:     Complexity:  Simple  Procedure details:     Needle aspiration: yes      Needle size:  25 G    Incision depth:  Dermal    Drainage:  Serous    Drainage amount:  Moderate    Wound treatment:  Wound left open    Packing materials:  None  Post-procedure details:     Procedure completion:  Tolerated well, no immediate complications  Comments:      Pt tolerated procedure well. No acute complications.  Incision & Drainage    Date/Time: 7/22/2024 3:45 PM    Performed by: Filomena Bradford PA-C  Authorized by: Justen Moore DO    Consent:     Consent obtained:  Verbal    Consent given by:  Patient    Risks, benefits, and alternatives were discussed: yes      Risks discussed:  Bleeding, damage to other organs, infection, incomplete drainage and pain    Alternatives discussed:  No treatment, delayed treatment, alternative treatment, observation and referral  Universal protocol:     Procedure explained and questions  answered to patient or proxy's satisfaction: yes      Relevant documents present and verified: yes      Test results available : yes      Imaging studies available: yes      Required blood products, implants, devices, and special equipment available: yes      Site/side marked: yes      Immediately prior to procedure, a time out was called: yes      Patient identity confirmed:  Verbally with patient, arm band, provided demographic data and hospital-assigned identification number  Location:     Type:  Bulla    Location:  Lower extremity    Lower extremity location:  Toe    Toe location:  R little toe  Pre-procedure details:     Skin preparation:  Chlorhexidine with alcohol and povidone-iodine  Sedation:     Sedation type:  None  Anesthesia:     Anesthesia method:  None  Procedure type:     Complexity:  Simple  Procedure details:     Needle aspiration: yes      Needle size:  25 G    Incision depth:  Dermal    Drainage:  Serous    Drainage amount:  Moderate    Wound treatment:  Wound left open    Packing materials:  None  Post-procedure details:     Procedure completion:  Tolerated well, no immediate complications  Comments:      Pt tolerated procedure well. No acute complications.             ED Course                                             Medical Decision Making  83-year-old female with past medical history of skin cancer, scoliosis, osteoporosis, osteoarthritis, mitral valve stenosis, hypertension, hypercholesterolemia, anemia, gout, glaucoma, GERD, controlled diabetes, COVID-19, coronary artery disease, and referral neuropathy presents to the emergency room with blistering to her fourth and fifth toe on her right foot.  States 1 week ago she hit her 2 toes on her right foot on a chair or something and states the following day noticed blisters forming.  She states on the following Wednesday she followed up with her primary care physician at [facility] and was told to let the blisters rupture on their own and  placed her on doxycycline for prophylactic treatment.  Patient denies any redness, draining, pain, fevers, or chills.  She does state that she follows with a [facility], [facility] and could not get an appointment until this Thursday however was concerned because she takes Eliquis and noticed red particles in her fourth toe in the fluid.  Denies any specific aggravating or alleviating factors and states that she would just like a second opinion.  Denies any other complaints or concerns at this time.      Problems Addressed:  Skin bulla: complicated acute illness or injury    Risk  Prescription drug management.        Final diagnoses:   Skin bulla       ED Disposition  ED Disposition       ED Disposition   Discharge    Condition   Stable    Comment   --               Herman Bob PA  [address] [id number]  [phone]    In 2 days      Bella Casey, REFUGIO  [address] [place] [address]  [phone]    On 7/25/2024  as scheduled         Medication List      No changes were made to your prescriptions during this visit.            Filomena Bradford PA-C  07/22/24 1640

## 2024-07-29 ENCOUNTER — PATIENT OUTREACH (OUTPATIENT)
Dept: CASE MANAGEMENT | Facility: OTHER | Age: 84
End: 2024-07-29
Payer: MEDICARE

## 2024-07-29 NOTE — OUTREACH NOTE
AMBULATORY CASE MANAGEMENT NOTE    Names and Relationships of Patient/Support Persons: Contact: Elmo Martinez; Relationship: Self -     Patient Outreach    RN-ACM outreach to patient for follow-up of ED visit 7/22/24 for skin infection; dx'd with skin bulla. Pt reports taking prescribed medications with improvement noted. Education provided, understanding voiced. Pt denies additional questions/health concerns or need for additional outreach at present. Pt will follow-up with providers as scheduled; will RTC as needed for new/worsening symptoms.     Adult Patient Profile  Questions/Answers      Flowsheet Row Most Recent Value   Symptoms/Conditions Managed at Home skin   Barriers to Managing Health age   Skin Symptoms/Conditions wound   Skin Management Strategies medication therapy, routine screening, dressing changes   Skin Self-Management Outcome 4 (good)   Skin Comment Pt reports good healing and has seen podiatrist   Identifying Health Goals keep illness under control   Taking Medications Not Prescribed no   Missed Doses of Prescribed Medications During Past Week no   Taken Prescribed Medications at Different Time or Schedule During Past Week no   Taken More or Less Medication Than Prescribed no   Barriers to Taking Medication as Prescribed none        SDOH updated and reviewed with the patient during this program:  Financial Resource Strain: Low Risk  (7/29/2024)    Overall Financial Resource Strain (CARDIA)     Difficulty of Paying Living Expenses: Not very hard      --     Food Insecurity: No Food Insecurity (7/29/2024)    Hunger Vital Sign     Worried About Running Out of Food in the Last Year: Never true     Ran Out of Food in the Last Year: Never true      --     Transportation Needs: No Transportation Needs (7/29/2024)    PRAPARE - Transportation     Lack of Transportation (Medical): No     Lack of Transportation (Non-Medical): No       Education Documentation  Unresolved/Worsening Symptoms, taught by  Andreia Washington, RN at 7/29/2024  2:22 PM.  Learner: Patient  Readiness: Acceptance  Method: Explanation  Response: Verbalizes Understanding    Site Care, taught by Andreia Washington, RN at 7/29/2024  2:22 PM.  Learner: Patient  Readiness: Acceptance  Method: Explanation  Response: Verbalizes Understanding    Provider Follow-Up, taught by Andreia Washington, RN at 7/29/2024  2:22 PM.  Learner: Patient  Readiness: Acceptance  Method: Explanation  Response: Verbalizes Understanding    Medication Management, taught by Andreia Washington, RN at 7/29/2024  2:22 PM.  Learner: Patient  Readiness: Acceptance  Method: Explanation  Response: Verbalizes Understanding    Infection Prevention, taught by Andreia Washington, RN at 7/29/2024  2:22 PM.  Learner: Patient  Readiness: Acceptance  Method: Explanation  Response: Verbalizes Understanding          Andreia CENTENO  Ambulatory Case Management    7/29/2024, 14:23 EDT

## 2024-10-18 ENCOUNTER — TELEPHONE (OUTPATIENT)
Dept: CARDIOLOGY | Facility: CLINIC | Age: 84
End: 2024-10-18
Payer: MEDICARE

## 2024-10-18 NOTE — TELEPHONE ENCOUNTER
Caller: Jennifer Mccartney    Relationship to patient: Emergency Contact    Best call back number: [phone]    Chief complaint: PATIENT IS SCHEDULED WITH  ON 10.25.24 IN [place]. SHE IS UNABLE TO TRAVEL TO [place]. SHE WOULD LIKE TO SEE  IN [facility]. PER HUB WORKFLOW, FURTHER REVIEW IS NEEDED. PLEASE REACH OUT TO THE PT TO SCHEDULE.     Type of visit: FOLLOW UP     Requested date: ASAP IN [facility]    If rescheduling, when is the original appointment: 10.25.24     Additional notes:

## 2024-11-05 ENCOUNTER — OFFICE VISIT (OUTPATIENT)
Dept: CARDIOLOGY | Facility: CLINIC | Age: 84
End: 2024-11-05
Payer: MEDICARE

## 2024-11-05 VITALS
HEIGHT: 61 IN | SYSTOLIC BLOOD PRESSURE: 130 MMHG | WEIGHT: 175 LBS | HEART RATE: 110 BPM | BODY MASS INDEX: 33.04 KG/M2 | DIASTOLIC BLOOD PRESSURE: 86 MMHG | OXYGEN SATURATION: 94 %

## 2024-11-05 DIAGNOSIS — I10 PRIMARY HYPERTENSION: ICD-10-CM

## 2024-11-05 DIAGNOSIS — I05.0 MILD MITRAL STENOSIS: ICD-10-CM

## 2024-11-05 DIAGNOSIS — I48.11 LONGSTANDING PERSISTENT ATRIAL FIBRILLATION: Primary | ICD-10-CM

## 2024-11-05 DIAGNOSIS — I65.22 CAROTID STENOSIS, ASYMPTOMATIC, LEFT: ICD-10-CM

## 2024-11-05 DIAGNOSIS — E78.2 MIXED HYPERLIPIDEMIA: ICD-10-CM

## 2024-11-05 DIAGNOSIS — I34.0 MODERATE MITRAL REGURGITATION: ICD-10-CM

## 2024-11-05 PROCEDURE — 93000 ELECTROCARDIOGRAM COMPLETE: CPT | Performed by: INTERNAL MEDICINE

## 2024-11-05 PROCEDURE — 99214 OFFICE O/P EST MOD 30 MIN: CPT | Performed by: INTERNAL MEDICINE

## 2024-11-05 NOTE — PROGRESS NOTES
"[facility]    Encounter Date: 2024    Patient ID: Elmo Martinez is a 84 y.o. female.  : 1940     PCP: Herman Bob PA       Chief Complaint: Longstanding persistent atrial fibrillation    PROBLEM LIST:  Atrial fibrillation  2 week monitor 2021: SR with occasional PACs with short runs of SVT vs possible Afib   Echo 2021: EF 66-70%, mild AS, moderate MR, mild MS, elevated RVSP  ECG 23 - atrial fibrillation, ventricular rate 85bpm  Chest pain  Echocardiogram with LVEF >65%, mild LVH 3/29/13  Normal regadenoson cardiolite stress with LVEF 76% 3/29/13  Echocardiogram LVEF 60%-65%, mod MR, mild to mod TR and evidence severe pulmonary HTN 16  Abnormal lexiscan nuclear stress 16  [facility], 2016:  Normal LV function, normal coronaries, normal pulmonary artery pressures.  Echo, 2018: \"severe PA hypertension\" and mild MS\"  Hypertension  Dyslipidemia, intolerant to statins secondary to myalgias and elevated CPK   Well controled on PSK9 inhibitor  Diabetes Mellitus with neuropathy  Carotid artery disease  Carotid duplex 2020: LICA 50-69% stenosis, asymptomatic   GERD and hiatal hernia  Osteoarthritis  Skin cancer  Glaucoma  History of gout  Scoliosis  Small stomach ulcers  Surgical history: hysterectomy, glaucoma, appendectomy       History of Present Illness  Elmo Martinez is a 84 y.o. with a history of atrial fibrillation and cardiac risk factors.  Since her last office visit in 2023 she was admitted to [facility] from 3/12 through 3/20 after presenting with decreased LOC.  On presentation she was found to have acute kidney injury as well as evidence of UTI and suspicion for polypharmacy.  She was discharged to rehab and eventually home.  Since her last visit she has been taken off of diltiazem due to having lower blood pressures at times.      Allergies   Allergen Reactions    Codeine Mental Status Change    " Meperidine Mental Status Change    Morphine Mental Status Change    Statins Myalgia     Zocor, lipitor, welchol and pravachol    Sulfa Antibiotics Rash    Other Swelling     Swelling at site of insertion    Imdur [Isosorbide Nitrate] Mental Status Change    Influenza Virus Vaccine Swelling         Current Outpatient Medications:     allopurinol (ZYLOPRIM) 300 MG tablet, Take 1 tablet by mouth As Needed., Disp: , Rfl:     ALPRAZolam (XANAX) 0.5 MG tablet, Take 1 tablet by mouth 2 (Two) Times a Day As Needed. Takes 0.25 prn, Disp: , Rfl:     apixaban (ELIQUIS) 5 MG tablet tablet, Take 1 tablet by mouth Every 12 (Twelve) Hours., Disp: 180 tablet, Rfl: 3    aspirin 81 MG tablet, Take 1 tablet by mouth Daily., Disp: , Rfl:     brimonidine (ALPHAGAN) 0.2 % ophthalmic solution, 1 drop 2 (Two) Times a Day., Disp: , Rfl:     bumetanide (BUMEX) 1 MG tablet, Take 1 tablet by mouth Daily., Disp: , Rfl:     dorzolamide-timolol (COSOPT) 22.3-6.8 MG/ML ophthalmic solution, Administer 1 drop to both eyes 2 (Two) Times a Day., Disp: , Rfl:     Evolocumab (Repatha SureClick) solution auto-injector SureClick injection, Inject 1 mL under the skin into the appropriate area as directed Every 14 (Fourteen) Days., Disp: 2 mL, Rfl: 11    FIBER PO, Take  by mouth Daily., Disp: , Rfl:     lisinopril (PRINIVIL,ZESTRIL) 10 MG tablet, Take 1 tablet by mouth Daily., Disp: , Rfl:     meclizine (ANTIVERT) 25 MG tablet, Take 1 tablet by mouth 3 (Three) Times a Day As Needed for Dizziness., Disp: , Rfl:     metoprolol tartrate (LOPRESSOR) 100 MG tablet, Take 1 tablet by mouth 2 (Two) Times a Day., Disp: 180 tablet, Rfl: 3    nitroglycerin (NITROSTAT) 0.4 MG SL tablet, Place 1 tablet under the tongue Every 5 (Five) Minutes As Needed for Chest Pain. Take no more than 3 doses in 15 minutes., Disp: , Rfl:     ondansetron (ZOFRAN) 4 MG tablet, Take 1 tablet by mouth Every 8 (Eight) Hours As Needed for Nausea or Vomiting., Disp: , Rfl:     pantoprazole  "(PROTONIX) 40 MG EC tablet, Take 1 tablet by mouth Daily., Disp: , Rfl:     Polyethylene Glycol 1000 powder, As Needed. (Patient not taking: Reported on 11/5/2024), Disp: , Rfl:     timolol (TIMOPTIC) 0.5 % ophthalmic solution, Administer 1 drop to both eyes 2 (Two) Times a Day. (Patient not taking: Reported on 11/5/2024), Disp: , Rfl:     The following portions of the patient's history were reviewed and updated as appropriate: allergies, current medications, past family history, past medical history, past social history, past surgical history and problem list.    Review of Systems   Cardiovascular:  Positive for irregular heartbeat and leg swelling. Negative for chest pain and dyspnea on exertion.   Musculoskeletal:  Positive for arthritis, back pain and falls.             Objective:     Visit Vitals  /86 (BP Location: Left arm, Patient Position: Sitting)   Pulse 110   Ht 154.9 cm (61\")   Wt 79.4 kg (175 lb)   LMP  (LMP Unknown)   SpO2 94%   BMI 33.07 kg/m²   Physical Exam  Constitutional: Older white female, ambulating with rolling walker  HENT: HEENT exam unremarkable.   Neck: Neck supple. No JVD present. No carotid bruits.   Cardiovascular: Normal rate, irregularly irregular, distant heart sounds, no murmur heard.   2+ symmetric pulses.   Pulmonary/Chest: Breath sounds normal. Does not exhibit tenderness.   Abdominal: Abdomen benign.   Musculoskeletal: 1+ bilateral LE edema.   Neurological: Neurological exam unremarkable.     Data Review:     Lab Results   Component Value Date    WBC 9.0 09/25/2024    HGB 12.5 09/25/2024    HCT 40.1 09/25/2024    MCV 89 09/25/2024     09/25/2024      Lab Results   Component Value Date    GLUCOSE 124 (H) 03/21/2024    BUN 13 03/21/2024    CREATININE 0.50 (L) 03/21/2024    EGFR 93.8 03/21/2024    BCR 26.0 (H) 03/21/2024    K 3.2 (L) 03/21/2024    CO2 35.2 (H) 03/21/2024    CALCIUM 9.3 03/21/2024    ALBUMIN 3.6 03/21/2024    BILITOT 0.9 03/21/2024    AST 16 03/21/2024 "    ALT 17 03/21/2024      Lab date: 06/16/2022  FLP: , , HDL 56, LDL 27  CMP: Glu 102, BUN 8, Creat 0.85, eGFR 69, Na 141, K 5.1, Cl 101, CO2 26, Ca 9.4, Alk Phos 87, AST 11, ALT 8  CBC: WBC 10.2, RBC 5.27, HGB 12.6, HCT 42.2, MCV 80, MCH 23.9,         ECG 12 Lead    Date/Time: 11/5/2024 2:17 PM  Performed by: Gareth Hagan MD    Authorized by: Gareth Hagan MD  Comparison: compared with previous ECG from 2/22/2023  Similar to previous ECG  Rhythm: atrial fibrillation  Rate: tachycardic  BPM: 102  Other findings: low voltage    Clinical impression: abnormal EKG           Advance Care Planning   ACP discussion was declined by the patient. Patient has an advance directive in EMR which is still valid.            Assessment:     Diagnosis Plan   Atrial fibrillation, unspecified type No recurring palpitations, acceptable rates today given hypotension associated with diltiazem    If her rates increase to 120/130 would consider cessation of lisinopril and resumption of CCB or increasing metoprolol     continue on Eliquis 5 mg BID, aspirin 81 mg, and diltiazem 180, metoprolol 100 mg.      Essential hypertension  Controlled.  Continue current medications     Mixed hyperlipidemia  Well controlled. Continue on repatha 140 mg.        Plan:   Stable cardiac status.   Continue all other current medications.   No follow-ups on file.   Thank you for allowing us to participate in the care of your patient.     MARIA EUGENIA Hagan MD  11/05/24 14:20 EST

## 2025-02-09 ENCOUNTER — APPOINTMENT (OUTPATIENT)
Dept: CT IMAGING | Facility: HOSPITAL | Age: 85
End: 2025-02-09
Payer: MEDICARE

## 2025-02-09 ENCOUNTER — HOSPITAL ENCOUNTER (EMERGENCY)
Facility: HOSPITAL | Age: 85
Discharge: HOME | End: 2025-02-10
Payer: MEDICARE

## 2025-02-09 ENCOUNTER — APPOINTMENT (OUTPATIENT)
Dept: GENERAL RADIOLOGY | Facility: HOSPITAL | Age: 85
End: 2025-02-09
Payer: MEDICARE

## 2025-02-09 DIAGNOSIS — I50.9 ACUTE ON CHRONIC CONGESTIVE HEART FAILURE, UNSPECIFIED HEART FAILURE TYPE: Primary | ICD-10-CM

## 2025-02-09 LAB
ALBUMIN SERPL-MCNC: 3.6 G/DL (ref 3.5–5.2)
ALBUMIN/GLOB SERPL: 1.3 G/DL
ALP SERPL-CCNC: 106 U/L (ref 39–117)
ALT SERPL W P-5'-P-CCNC: 5 U/L (ref 1–33)
ANION GAP SERPL CALCULATED.3IONS-SCNC: 11.8 MMOL/L (ref 5–15)
AST SERPL-CCNC: 13 U/L (ref 1–32)
B PARAPERT DNA SPEC QL NAA+PROBE: NOT DETECTED
B PERT DNA SPEC QL NAA+PROBE: NOT DETECTED
BACTERIA UR QL AUTO: ABNORMAL /HPF
BASOPHILS # BLD AUTO: 0.1 10*3/MM3 (ref 0–0.2)
BASOPHILS NFR BLD AUTO: 1 % (ref 0–1.5)
BILIRUB SERPL-MCNC: 0.8 MG/DL (ref 0–1.2)
BILIRUB UR QL STRIP: NEGATIVE
BUN SERPL-MCNC: 17 MG/DL (ref 8–23)
BUN/CREAT SERPL: 17.2 (ref 7–25)
C PNEUM DNA NPH QL NAA+NON-PROBE: NOT DETECTED
CALCIUM SPEC-SCNC: 9.1 MG/DL (ref 8.6–10.5)
CHLORIDE SERPL-SCNC: 101 MMOL/L (ref 98–107)
CLARITY UR: CLEAR
CO2 SERPL-SCNC: 26.2 MMOL/L (ref 22–29)
COLOR UR: YELLOW
CREAT SERPL-MCNC: 0.99 MG/DL (ref 0.57–1)
CRP SERPL-MCNC: 0.93 MG/DL (ref 0–0.5)
D-LACTATE SERPL-SCNC: 1.6 MMOL/L (ref 0.5–2)
DEPRECATED RDW RBC AUTO: 51 FL (ref 37–54)
EGFRCR SERPLBLD CKD-EPI 2021: 56.7 ML/MIN/1.73
EOSINOPHIL # BLD AUTO: 0.12 10*3/MM3 (ref 0–0.4)
EOSINOPHIL NFR BLD AUTO: 1.3 % (ref 0.3–6.2)
ERYTHROCYTE [DISTWIDTH] IN BLOOD BY AUTOMATED COUNT: 16.1 % (ref 12.3–15.4)
ERYTHROCYTE [SEDIMENTATION RATE] IN BLOOD: 5 MM/HR (ref 0–30)
FLUAV SUBTYP SPEC NAA+PROBE: NOT DETECTED
FLUBV RNA ISLT QL NAA+PROBE: NOT DETECTED
GEN 5 1HR TROPONIN T REFLEX: 19 NG/L
GLOBULIN UR ELPH-MCNC: 2.7 GM/DL
GLUCOSE SERPL-MCNC: 109 MG/DL (ref 65–99)
GLUCOSE UR STRIP-MCNC: NEGATIVE MG/DL
HADV DNA SPEC NAA+PROBE: NOT DETECTED
HCOV 229E RNA SPEC QL NAA+PROBE: NOT DETECTED
HCOV HKU1 RNA SPEC QL NAA+PROBE: NOT DETECTED
HCOV NL63 RNA SPEC QL NAA+PROBE: NOT DETECTED
HCOV OC43 RNA SPEC QL NAA+PROBE: NOT DETECTED
HCT VFR BLD AUTO: 35.5 % (ref 34–46.6)
HGB BLD-MCNC: 10.6 G/DL (ref 12–15.9)
HGB UR QL STRIP.AUTO: NEGATIVE
HMPV RNA NPH QL NAA+NON-PROBE: NOT DETECTED
HOLD SPECIMEN: NORMAL
HOLD SPECIMEN: NORMAL
HPIV1 RNA ISLT QL NAA+PROBE: NOT DETECTED
HPIV2 RNA SPEC QL NAA+PROBE: NOT DETECTED
HPIV3 RNA NPH QL NAA+PROBE: NOT DETECTED
HPIV4 P GENE NPH QL NAA+PROBE: NOT DETECTED
HYALINE CASTS UR QL AUTO: ABNORMAL /LPF
IMM GRANULOCYTES # BLD AUTO: 0.05 10*3/MM3 (ref 0–0.05)
IMM GRANULOCYTES NFR BLD AUTO: 0.5 % (ref 0–0.5)
KETONES UR QL STRIP: NEGATIVE
LEUKOCYTE ESTERASE UR QL STRIP.AUTO: NEGATIVE
LYMPHOCYTES # BLD AUTO: 1.48 10*3/MM3 (ref 0.7–3.1)
LYMPHOCYTES NFR BLD AUTO: 15.5 % (ref 19.6–45.3)
M PNEUMO IGG SER IA-ACNC: NOT DETECTED
MCH RBC QN AUTO: 26 PG (ref 26.6–33)
MCHC RBC AUTO-ENTMCNC: 29.9 G/DL (ref 31.5–35.7)
MCV RBC AUTO: 87 FL (ref 79–97)
MONOCYTES # BLD AUTO: 0.84 10*3/MM3 (ref 0.1–0.9)
MONOCYTES NFR BLD AUTO: 8.8 % (ref 5–12)
NEUTROPHILS NFR BLD AUTO: 6.94 10*3/MM3 (ref 1.7–7)
NEUTROPHILS NFR BLD AUTO: 72.9 % (ref 42.7–76)
NITRITE UR QL STRIP: NEGATIVE
NRBC BLD AUTO-RTO: 0 /100 WBC (ref 0–0.2)
NT-PROBNP SERPL-MCNC: 5954 PG/ML (ref 0–1800)
PH UR STRIP.AUTO: 6 [PH] (ref 5–8)
PLATELET # BLD AUTO: 217 10*3/MM3 (ref 140–450)
PMV BLD AUTO: 11.8 FL (ref 6–12)
POTASSIUM SERPL-SCNC: 4.3 MMOL/L (ref 3.5–5.2)
PROCALCITONIN SERPL-MCNC: 0.05 NG/ML (ref 0–0.25)
PROT SERPL-MCNC: 6.3 G/DL (ref 6–8.5)
PROT UR QL STRIP: ABNORMAL
QT INTERVAL: 370 MS
QTC INTERVAL: 489 MS
RBC # BLD AUTO: 4.08 10*6/MM3 (ref 3.77–5.28)
RBC # UR STRIP: ABNORMAL /HPF
REF LAB TEST METHOD: ABNORMAL
RHINOVIRUS RNA SPEC NAA+PROBE: NOT DETECTED
RSV RNA NPH QL NAA+NON-PROBE: NOT DETECTED
SARS-COV-2 RNA RESP QL NAA+PROBE: NOT DETECTED
SODIUM SERPL-SCNC: 139 MMOL/L (ref 136–145)
SP GR UR STRIP: 1.01 (ref 1–1.03)
SQUAMOUS #/AREA URNS HPF: ABNORMAL /HPF
TROPONIN T % DELTA: 0
TROPONIN T NUMERIC DELTA: 0 NG/L
TROPONIN T SERPL HS-MCNC: 19 NG/L
UROBILINOGEN UR QL STRIP: ABNORMAL
WBC # UR STRIP: ABNORMAL /HPF
WBC NRBC COR # BLD AUTO: 9.53 10*3/MM3 (ref 3.4–10.8)
WHOLE BLOOD HOLD COAG: NORMAL
WHOLE BLOOD HOLD SPECIMEN: NORMAL

## 2025-02-09 PROCEDURE — 71250 CT THORAX DX C-: CPT | Performed by: RADIOLOGY

## 2025-02-09 PROCEDURE — 84145 PROCALCITONIN (PCT): CPT | Performed by: NURSE PRACTITIONER

## 2025-02-09 PROCEDURE — 71250 CT THORAX DX C-: CPT

## 2025-02-09 PROCEDURE — 87040 BLOOD CULTURE FOR BACTERIA: CPT | Performed by: NURSE PRACTITIONER

## 2025-02-09 PROCEDURE — 81001 URINALYSIS AUTO W/SCOPE: CPT

## 2025-02-09 PROCEDURE — 80053 COMPREHEN METABOLIC PANEL: CPT | Performed by: NURSE PRACTITIONER

## 2025-02-09 PROCEDURE — 25510000001 IOPAMIDOL 61 % SOLUTION

## 2025-02-09 PROCEDURE — 84484 ASSAY OF TROPONIN QUANT: CPT

## 2025-02-09 PROCEDURE — 86140 C-REACTIVE PROTEIN: CPT | Performed by: NURSE PRACTITIONER

## 2025-02-09 PROCEDURE — 0202U NFCT DS 22 TRGT SARS-COV-2: CPT | Performed by: NURSE PRACTITIONER

## 2025-02-09 PROCEDURE — 36415 COLL VENOUS BLD VENIPUNCTURE: CPT

## 2025-02-09 PROCEDURE — 74177 CT ABD & PELVIS W/CONTRAST: CPT

## 2025-02-09 PROCEDURE — 83880 ASSAY OF NATRIURETIC PEPTIDE: CPT | Performed by: NURSE PRACTITIONER

## 2025-02-09 PROCEDURE — 71045 X-RAY EXAM CHEST 1 VIEW: CPT | Performed by: RADIOLOGY

## 2025-02-09 PROCEDURE — 83605 ASSAY OF LACTIC ACID: CPT | Performed by: NURSE PRACTITIONER

## 2025-02-09 PROCEDURE — 74177 CT ABD & PELVIS W/CONTRAST: CPT | Performed by: RADIOLOGY

## 2025-02-09 PROCEDURE — 85652 RBC SED RATE AUTOMATED: CPT | Performed by: NURSE PRACTITIONER

## 2025-02-09 PROCEDURE — 99285 EMERGENCY DEPT VISIT HI MDM: CPT

## 2025-02-09 PROCEDURE — 71045 X-RAY EXAM CHEST 1 VIEW: CPT

## 2025-02-09 PROCEDURE — 85025 COMPLETE CBC W/AUTO DIFF WBC: CPT | Performed by: NURSE PRACTITIONER

## 2025-02-09 PROCEDURE — 93010 ELECTROCARDIOGRAM REPORT: CPT | Performed by: INTERNAL MEDICINE

## 2025-02-09 PROCEDURE — 93005 ELECTROCARDIOGRAM TRACING: CPT

## 2025-02-09 RX ORDER — SODIUM CHLORIDE 0.9 % (FLUSH) 0.9 %
10 SYRINGE (ML) INJECTION AS NEEDED
Status: DISCONTINUED | OUTPATIENT
Start: 2025-02-09 | End: 2025-02-10 | Stop reason: HOSPADM

## 2025-02-09 RX ORDER — FUROSEMIDE 10 MG/ML
80 INJECTION INTRAMUSCULAR; INTRAVENOUS ONCE
Status: COMPLETED | OUTPATIENT
Start: 2025-02-10 | End: 2025-02-10

## 2025-02-09 RX ORDER — IOPAMIDOL 612 MG/ML
100 INJECTION, SOLUTION INTRAVASCULAR
Status: COMPLETED | OUTPATIENT
Start: 2025-02-09 | End: 2025-02-09

## 2025-02-09 RX ADMIN — IOPAMIDOL 80 ML: 612 INJECTION, SOLUTION INTRAVENOUS at 23:26

## 2025-02-10 VITALS
HEART RATE: 86 BPM | RESPIRATION RATE: 18 BRPM | TEMPERATURE: 98.2 F | HEIGHT: 62 IN | SYSTOLIC BLOOD PRESSURE: 144 MMHG | BODY MASS INDEX: 32.2 KG/M2 | WEIGHT: 175 LBS | DIASTOLIC BLOOD PRESSURE: 86 MMHG | OXYGEN SATURATION: 97 %

## 2025-02-10 PROCEDURE — 36415 COLL VENOUS BLD VENIPUNCTURE: CPT

## 2025-02-10 PROCEDURE — 96374 THER/PROPH/DIAG INJ IV PUSH: CPT

## 2025-02-10 PROCEDURE — 25010000002 FUROSEMIDE PER 20 MG: Performed by: NURSE PRACTITIONER

## 2025-02-10 RX ORDER — ACETAMINOPHEN 500 MG
1000 TABLET ORAL ONCE
Status: COMPLETED | OUTPATIENT
Start: 2025-02-10 | End: 2025-02-10

## 2025-02-10 RX ADMIN — ACETAMINOPHEN 1000 MG: 500 TABLET ORAL at 01:06

## 2025-02-10 RX ADMIN — FUROSEMIDE 80 MG: 10 INJECTION, SOLUTION INTRAMUSCULAR; INTRAVENOUS at 00:17

## 2025-02-10 NOTE — ED PROVIDER NOTES
Subjective   History of Present Illness  Patient is an 84-year-old female with significant past medical history positive for CAD, type 2 diabetes, GERD, hypertension, low back pain, osteoporosis presenting to the ER for evaluation of shortness of breath and chest pain. EMS advises they were called out for SOB upon standing and minimal exertion.  Patient has a hx of Afib and CHF on Eliquis.  Denies abdominal pain or diarrhea.  Does endorse constipation with straining.    History provided by:  Patient   used: No        Review of Systems   Constitutional: Negative.  Negative for fever.   HENT: Negative.     Respiratory:  Positive for shortness of breath.    Cardiovascular:  Positive for chest pain.   Gastrointestinal: Negative.  Negative for abdominal pain.   Endocrine: Negative.    Genitourinary: Negative.  Negative for dysuria.   Skin: Negative.    Neurological: Negative.    Psychiatric/Behavioral: Negative.     All other systems reviewed and are negative.      Past Medical History:   Diagnosis Date    Balance problem     lack of balance between leisure activites and work    Coronary artery disease     COVID     Diabetes mellitus     diet controlled. Checks blood sugar once a day. diagnosed 4 years    GERD (gastroesophageal reflux disease)     Glaucoma     Gout     Heart palpitations     Hiatal hernia     History of transfusion     1963    Hypercholesterolemia     Hypertension     Low back pain     Mitral valve stenosis     Osteoarthritis     Osteoporosis     PONV (postoperative nausea and vomiting)     Scoliosis     Skin cancer     Spinal headache     mild    Wears glasses        Allergies   Allergen Reactions    Codeine Mental Status Change    Meperidine Mental Status Change    Morphine Mental Status Change    Statins Myalgia     Zocor, lipitor, welchol and pravachol    Sulfa Antibiotics Rash    Other Swelling     Swelling at site of insertion    Imdur [Isosorbide Nitrate] Mental Status Change     Influenza Virus Vaccine Swelling       Past Surgical History:   Procedure Laterality Date    CARDIAC CATHETERIZATION N/A 6/22/2016    Procedure: right and left heart, abnormal stress, abnormal echo revealing pulm HTN;  Surgeon: Pancho Casey MD;  Location: [facility] INVASIVE LOCATION;  Service:     CATARACT EXTRACTION      COLONOSCOPY      2015    GLAUCOMA SURGERY      HYSTERECTOMY      TUBAL ABDOMINAL LIGATION         Family History   Problem Relation Age of Onset    Cancer Other     Diabetes Other     Gout Other     Heart disease Other     Hypertension Other     Osteoarthritis Other     Osteoporosis Other     Rheum arthritis Other     Osteoarthritis Mother     Cancer Mother     Hypertension Mother     COPD Father     Cancer Sister     Osteoarthritis Sister     Hypertension Sister     Cancer Maternal Grandfather     Breast cancer Neg Hx        Social History     Socioeconomic History    Marital status:    Tobacco Use    Smoking status: Never    Smokeless tobacco: Never   Vaping Use    Vaping status: Never Used   Substance and Sexual Activity    Alcohol use: No    Drug use: No    Sexual activity: Defer           Objective   Physical Exam  Vitals and nursing note reviewed.   Constitutional:       General: She is not in acute distress.     Appearance: She is well-developed. She is not diaphoretic.   HENT:      Head: Normocephalic and atraumatic.      Right Ear: External ear normal.      Left Ear: External ear normal.      Nose: Nose normal.   Eyes:      Conjunctiva/sclera: Conjunctivae normal.      Pupils: Pupils are equal, round, and reactive to light.   Neck:      Vascular: No JVD.      Trachea: No tracheal deviation.   Cardiovascular:      Rate and Rhythm: Normal rate and regular rhythm.      Heart sounds: Normal heart sounds. No murmur heard.  Pulmonary:      Effort: Pulmonary effort is normal. No respiratory distress.      Breath sounds: Normal breath sounds. Decreased breath sounds present. No  wheezing.   Abdominal:      General: Bowel sounds are normal.      Palpations: Abdomen is soft.      Tenderness: There is no abdominal tenderness.   Musculoskeletal:         General: No deformity. Normal range of motion.      Cervical back: Normal range of motion and neck supple.      Right lower leg: Edema present.      Left lower leg: Edema present.   Skin:     General: Skin is warm and dry.      Capillary Refill: Capillary refill takes 2 to 3 seconds.      Coloration: Skin is pale.      Findings: No erythema or rash.   Neurological:      Mental Status: She is alert and oriented to person, place, and time.      Cranial Nerves: No cranial nerve deficit.   Psychiatric:         Behavior: Behavior normal.         Thought Content: Thought content normal.         Procedures       Results for orders placed or performed during the hospital encounter of 02/09/25   ECG 12 Lead Dyspnea    Collection Time: 02/09/25  7:53 PM   Result Value Ref Range    QT Interval 370 ms    QTC Interval 489 ms   High Sensitivity Troponin T    Collection Time: 02/09/25  8:16 PM    Specimen: Arm, Right; Blood   Result Value Ref Range    HS Troponin T 19 (H) <14 ng/L   Comprehensive Metabolic Panel    Collection Time: 02/09/25  9:12 PM    Specimen: Blood   Result Value Ref Range    Glucose 109 (H) 65 - 99 mg/dL    BUN 17 8 - 23 mg/dL    Creatinine 0.99 0.57 - 1.00 mg/dL    Sodium 139 136 - 145 mmol/L    Potassium 4.3 3.5 - 5.2 mmol/L    Chloride 101 98 - 107 mmol/L    CO2 26.2 22.0 - 29.0 mmol/L    Calcium 9.1 8.6 - 10.5 mg/dL    Total Protein 6.3 6.0 - 8.5 g/dL    Albumin 3.6 3.5 - 5.2 g/dL    ALT (SGPT) 5 1 - 33 U/L    AST (SGOT) 13 1 - 32 U/L    Alkaline Phosphatase 106 39 - 117 U/L    Total Bilirubin 0.8 0.0 - 1.2 mg/dL    Globulin 2.7 gm/dL    A/G Ratio 1.3 g/dL    BUN/Creatinine Ratio 17.2 7.0 - 25.0    Anion Gap 11.8 5.0 - 15.0 mmol/L    eGFR 56.7 (L) >60.0 mL/min/1.73   BNP    Collection Time: 02/09/25  9:12 PM    Specimen: Blood   Result  Value Ref Range    proBNP 5,954.0 (H) 0.0 - 1,800.0 pg/mL   Lactic Acid, Plasma    Collection Time: 02/09/25  9:12 PM    Specimen: Blood   Result Value Ref Range    Lactate 1.6 0.5 - 2.0 mmol/L   Procalcitonin    Collection Time: 02/09/25  9:12 PM    Specimen: Blood   Result Value Ref Range    Procalcitonin 0.05 0.00 - 0.25 ng/mL   Sedimentation Rate    Collection Time: 02/09/25  9:12 PM    Specimen: Blood   Result Value Ref Range    Sed Rate 5 0 - 30 mm/hr   C-reactive Protein    Collection Time: 02/09/25  9:12 PM    Specimen: Blood   Result Value Ref Range    C-Reactive Protein 0.93 (H) 0.00 - 0.50 mg/dL   CBC Auto Differential    Collection Time: 02/09/25  9:12 PM    Specimen: Blood   Result Value Ref Range    WBC 9.53 3.40 - 10.80 10*3/mm3    RBC 4.08 3.77 - 5.28 10*6/mm3    Hemoglobin 10.6 (L) 12.0 - 15.9 g/dL    Hematocrit 35.5 34.0 - 46.6 %    MCV 87.0 79.0 - 97.0 fL    MCH 26.0 (L) 26.6 - 33.0 pg    MCHC 29.9 (L) 31.5 - 35.7 g/dL    RDW 16.1 (H) 12.3 - 15.4 %    RDW-SD 51.0 37.0 - 54.0 fl    MPV 11.8 6.0 - 12.0 fL    Platelets 217 140 - 450 10*3/mm3    Neutrophil % 72.9 42.7 - 76.0 %    Lymphocyte % 15.5 (L) 19.6 - 45.3 %    Monocyte % 8.8 5.0 - 12.0 %    Eosinophil % 1.3 0.3 - 6.2 %    Basophil % 1.0 0.0 - 1.5 %    Immature Grans % 0.5 0.0 - 0.5 %    Neutrophils, Absolute 6.94 1.70 - 7.00 10*3/mm3    Lymphocytes, Absolute 1.48 0.70 - 3.10 10*3/mm3    Monocytes, Absolute 0.84 0.10 - 0.90 10*3/mm3    Eosinophils, Absolute 0.12 0.00 - 0.40 10*3/mm3    Basophils, Absolute 0.10 0.00 - 0.20 10*3/mm3    Immature Grans, Absolute 0.05 0.00 - 0.05 10*3/mm3    nRBC 0.0 0.0 - 0.2 /100 WBC   High Sensitivity Troponin T 1Hr    Collection Time: 02/09/25  9:12 PM    Specimen: Blood   Result Value Ref Range    HS Troponin T 19 (H) <14 ng/L    Troponin T Numeric Delta 0 ng/L    Troponin T % Delta 0 Abnormal if >/= 20%   Green Top (Gel)    Collection Time: 02/09/25  9:12 PM   Result Value Ref Range    Extra Tube Hold for  add-ons.    Lavender Top    Collection Time: 02/09/25  9:12 PM   Result Value Ref Range    Extra Tube hold for add-on    Gold Top - SST    Collection Time: 02/09/25  9:12 PM   Result Value Ref Range    Extra Tube Hold for add-ons.    Light Blue Top    Collection Time: 02/09/25  9:12 PM   Result Value Ref Range    Extra Tube Hold for add-ons.    Respiratory Panel PCR w/COVID-19(SARS-CoV-2) [facility] In-House, NP Swab in UTM/VTM, 2 HR TAT - Swab, Nasopharynx    Collection Time: 02/09/25  9:16 PM    Specimen: Nasopharynx; Swab   Result Value Ref Range    ADENOVIRUS, PCR Not Detected Not Detected    Coronavirus 229E Not Detected Not Detected    Coronavirus HKU1 Not Detected Not Detected    Coronavirus NL63 Not Detected Not Detected    Coronavirus OC43 Not Detected Not Detected    COVID19 Not Detected Not Detected - Ref. Range    Human Metapneumovirus Not Detected Not Detected    Human Rhinovirus/Enterovirus Not Detected Not Detected    Influenza A PCR Not Detected Not Detected    Influenza B PCR Not Detected Not Detected    Parainfluenza Virus 1 Not Detected Not Detected    Parainfluenza Virus 2 Not Detected Not Detected    Parainfluenza Virus 3 Not Detected Not Detected    Parainfluenza Virus 4 Not Detected Not Detected    RSV, PCR Not Detected Not Detected    Bordetella pertussis pcr Not Detected Not Detected    Bordetella parapertussis PCR Not Detected Not Detected    Chlamydophila pneumoniae PCR Not Detected Not Detected    Mycoplasma pneumo by PCR Not Detected Not Detected   Urinalysis With Microscopic If Indicated (No Culture) - Urine, Clean Catch    Collection Time: 02/09/25 10:27 PM    Specimen: Urine, Clean Catch   Result Value Ref Range    Color, UA Yellow Yellow, Straw    Appearance, UA Clear Clear    pH, UA 6.0 5.0 - 8.0    Specific Gravity, UA 1.010 1.005 - 1.030    Glucose, UA Negative Negative    Ketones, UA Negative Negative    Bilirubin, UA Negative Negative    Blood, UA Negative Negative     Protein, UA 30 mg/dL (1+) (A) Negative    Leuk Esterase, UA Negative Negative    Nitrite, UA Negative Negative    Urobilinogen, UA 1.0 E.U./dL 0.2 - 1.0 E.U./dL   Urinalysis, Microscopic Only - Urine, Clean Catch    Collection Time: 02/09/25 10:27 PM    Specimen: Urine, Clean Catch   Result Value Ref Range    RBC, UA 3-5 (A) None Seen, 0-2 /HPF    WBC, UA 0-2 None Seen, 0-2 /HPF    Bacteria, UA None Seen None Seen /HPF    Squamous Epithelial Cells, UA 3-6 (A) None Seen, 0-2 /HPF    Hyaline Casts, UA None Seen None Seen /LPF    Methodology Automated Microscopy       CT Abdomen Pelvis With Contrast   Final Result   1. Small bilateral pleural effusions.   2. Diverticulosis without acute diverticulitis.   3. No bowel obstruction or perforation.   4. Cholelithiasis.               This report was finalized on 2/9/2025 11:39 PM by Marcio Singh MD.          CT Chest Without Contrast Diagnostic   Final Result   1. Small bilateral pleural effusions.   2. Cardiac enlargement.       This report was finalized on 2/9/2025 11:33 PM by Marcio Singh MD.          XR Chest 1 View   Final Result   Mild pulmonary vascular congestion.       This report was finalized on 2/9/2025 10:30 PM by Alex Pallas, DO.               ED Course  ED Course as of 02/10/25 0425   Sun Feb 09, 2025   2159 Report given to Marleen Dia at shift change.    Electronically signed by BETH Harvey, 02/09/25, 9:54 PM EST.   [SS]   Mon Feb 10, 2025   0042 ECG demonstrates atrial fibrillation with rapid ventricular response at 105 bpm.  QRS duration is normal.  QTc is prolonged at 499 ms.  There are no acute ST-T wave changes. [RA]      ED Course User Index  [RA] Jose Mayer MD  [SS] Susan Mckeon APRN                                                       Medical Decision Making  Patient is an 84-year-old female with significant past medical history positive for CAD, type 2 diabetes, GERD, hypertension, low back pain, osteoporosis  presenting to the ER for evaluation of shortness of breath and chest pain. EMS advises they were called out for SOB upon standing and minimal exertion.  Patient has a hx of Afib and CHF on Eliquis.  Denies abdominal pain or diarrhea.  Does endorse constipation with straining.      Problems Addressed:  Acute on chronic congestive heart failure, unspecified heart failure type: complicated acute illness or injury    Amount and/or Complexity of Data Reviewed  Labs: ordered.  Radiology: ordered.  ECG/medicine tests: ordered.    Risk  OTC drugs.  Prescription drug management.        Final diagnoses:   Acute on chronic congestive heart failure, unspecified heart failure type       ED Disposition  ED Disposition       ED Disposition   Discharge    Condition   Stable    Comment   --               Herman Bob, PA  [address] [place] [address]  [phone]    Call in 2 days           Medication List      No changes were made to your prescriptions during this visit.            Marleen Dia, APRN  02/10/25 0425

## 2025-02-11 ENCOUNTER — PATIENT OUTREACH (OUTPATIENT)
Dept: CASE MANAGEMENT | Facility: OTHER | Age: 85
End: 2025-02-11
Payer: MEDICARE

## 2025-02-11 NOTE — OUTREACH NOTE
AMBULATORY CASE MANAGEMENT NOTE    Names and Relationships of Patient/Support Persons: Contact: Juan Elmo NINO; Relationship: Self -     Patient Outreach    RN-ACM outreach to patient for follow-up of ED visit 2/09/25 for chest pain and shortness of breath. Patient sounds dyspneic over the phone, states her oxygen levels keep dropping into the 80's on room air and her son called Waylon Bob's office for an appointment; pt is to see provider today. Patient states she thinks she probably needs home O2; BNP was elevated in ED with edema noted. ACM agreed that patient should see provider today; interim outreach scheduled for follow-up. Care plan created, SDOH questionnaire sent via [employer]; outreach scheduled.     Adult Patient Profile  Questions/Answers      Flowsheet Row Most Recent Value   Symptoms/Conditions Managed at Home cardiovascular   Barriers to Managing Health age   Cardiovascular Symptoms/Conditions heart failure, hypertension   Cardiovascular Management Strategies routine screening, medication therapy, fluid modification, diet modification, adequate rest   Cardiovascular Self-Management Outcome unsure   Cardiovascular Comment Pt reports still feeling short of breath with O2 dropping into the 80's on room air   Identifying Health Goals keep illness under control, be more active   Taking Medications Not Prescribed no   Missed Doses of Prescribed Medications During Past Week no   Taken Prescribed Medications at Different Time or Schedule During Past Week no   Taken More or Less Medication Than Prescribed no   Barriers to Taking Medication as Prescribed none            Education Documentation  Unresolved/Worsening Symptoms, taught by Andreia Washington, RN at 2/11/2025  1:42 PM.  Learner: Patient  Readiness: Acceptance  Method: Explanation  Response: Verbalizes Understanding    Pulmonary Hygiene, taught by Andreia Washington, RN at 2/11/2025  1:42 PM.  Learner: Patient  Readiness: Acceptance  Method:  Explanation  Response: Verbalizes Understanding    Breathing Techniques, taught by Andreia Washington, RN at 2/11/2025  1:42 PM.  Learner: Patient  Readiness: Acceptance  Method: Explanation  Response: Verbalizes Understanding    Signs/Symptoms, taught by Andreia Washington, RN at 2/11/2025  1:42 PM.  Learner: Patient  Readiness: Acceptance  Method: Explanation  Response: Verbalizes Understanding    Unresolved/Worsening Symptoms, taught by Andreia Washington, RN at 2/11/2025  1:42 PM.  Learner: Patient  Readiness: Acceptance  Method: Explanation  Response: Verbalizes Understanding    VTE Prevention, taught by Andreia Washington, RN at 2/11/2025  1:42 PM.  Learner: Patient  Readiness: Acceptance  Method: Explanation  Response: Verbalizes Understanding    Self-Care, taught by Andreia Washington, RN at 2/11/2025  1:42 PM.  Learner: Patient  Readiness: Acceptance  Method: Explanation  Response: Verbalizes Understanding    Signs/Symptoms, taught by Andreia Washington, RN at 2/11/2025  1:42 PM.  Learner: Patient  Readiness: Acceptance  Method: Explanation  Response: Verbalizes Understanding          Andreia CENTENO  Ambulatory Case Management    2/11/2025, 13:44 EST

## 2025-02-11 NOTE — PLAN OF CARE
Problem: Heart Failure  Goal: Track and Manage Fluids and Swelling  Outcome: Progressing  Goal: Track and Manage Symptoms  Outcome: Progressing  Intervention: My Heart Failure Symptoms To Do List  Description:   Why is this important?  You will be able to handle your symptoms better if you keep track of them.  Making some simple changes to your lifestyle will help.  Knowing how to self-manage shortness of breath and when to call the doctor is very important.  Eating healthy is one thing you can do to take care of yourself.    Flowsheets (Taken 2/11/2025 1343)  My Heart Failure Symptoms To Do List:   begin a heart failure diary   bring diary to all appointments   know when to call the doctor   track symptoms and what helps to feel better or worse   follow rescue plan if symptoms flare up   rest in an upright position if breathing is hard   eat more whole grains, fruits and vegetables, lean meats and healthy fats  Goal: Track and Manage Activity and Exertion  Outcome: Progressing  Goal: Optimal Care Coordination of a Patient Experiencing Heart Failure  Outcome: Progressing  Intervention: Identify and Minimize Risk of Heart Failure Exacerbation  Flowsheets (Taken 2/11/2025 1343)  Identify and Minimize Risk of Heart Failure Exacerbation:   action plan developed   action plan reviewed   self-awareness of signs/symptoms of worsening disease encouraged  Intervention: Identify and Manage Comorbidities and Complications  Flowsheets (Taken 2/11/2025 1343)  Identify and Manage Comorbidities and Complications:   self-awareness of signs/symptoms of worsening disease encouraged   signs/symptoms of comorbidities identified   response to pharmacologic therapy monitored   home monitoring of blood pressure encouraged

## 2025-02-13 ENCOUNTER — PATIENT OUTREACH (OUTPATIENT)
Dept: CASE MANAGEMENT | Facility: OTHER | Age: 85
End: 2025-02-13
Payer: MEDICARE

## 2025-02-13 NOTE — OUTREACH NOTE
AMBULATORY CASE MANAGEMENT NOTE    Names and Relationships of Patient/Support Persons: Contact: Elmo Martinez; Relationship: Self -     Patient Outreach    Scheduled interim outreach with patient after PCP OV for follow-up. Patient reports feeling much better today, states she ended up not requiring supplemental oxygen. Pt states when her hands get very cold her pulse oximeter doesn't read accurately. Pt v/u of education provided, care plan goals reviewed, updated. Next outreach scheduled.     Education Documentation  Unresolved/Worsening Symptoms, taught by Andreia Washington RN at 2/13/2025  3:17 PM.  Learner: Patient  Readiness: Acceptance  Method: Explanation  Response: Verbalizes Understanding    Weight Monitoring, taught by Andreia Washington RN at 2/13/2025  3:17 PM.  Learner: Patient  Readiness: Acceptance  Method: Explanation  Response: Verbalizes Understanding    Provider Follow-Up, taught by Andreia Washington, RN at 2/13/2025  3:17 PM.  Learner: Patient  Readiness: Acceptance  Method: Explanation  Response: Verbalizes Understanding    Signs/Symptoms, taught by Andreia Washington RN at 2/13/2025  3:17 PM.  Learner: Patient  Readiness: Acceptance  Method: Explanation  Response: Verbalizes Understanding    Pulse Monitoring, taught by Andreia Washington RN at 2/13/2025  3:17 PM.  Learner: Patient  Readiness: Acceptance  Method: Explanation  Response: Verbalizes Understanding    Blood Pressure Monitoring, taught by Andreia Washington, RN at 2/13/2025  3:17 PM.  Learner: Patient  Readiness: Acceptance  Method: Explanation  Response: Verbalizes Understanding          Andreia CENTENO  Ambulatory Case Management    2/13/2025, 15:17 EST

## 2025-02-13 NOTE — PLAN OF CARE
Problem: Heart Failure  Goal: Track and Manage Fluids and Swelling  Outcome: Progressing  Intervention: My Fluids and Swelling To Do List  Description:   Why is this important?  It is important to check your weight at home every day.  Also, check for swelling in your feet and legs every day.  Keeping your legs up while you are sitting and doing ankle pump exercises will help with the swelling.    Flowsheets (Taken 2/13/2025 1514)  My Fluids and Swelling To Do List:   do ankle pumps when sitting   call office if I gain more than 2 pounds in 1 day or 5 pounds in 1 week   track weight in diary   weigh myself every day   watch for swelling in feet, ankles and legs every day   use salt in moderation   keep legs up when sitting  Goal: Track and Manage Symptoms  Outcome: Progressing  Intervention: My Heart Failure Symptoms To Do List  Description:   Why is this important?  You will be able to handle your symptoms better if you keep track of them.  Making some simple changes to your lifestyle will help.  Knowing how to self-manage shortness of breath and when to call the doctor is very important.  Eating healthy is one thing you can do to take care of yourself.    Flowsheets (Taken 2/13/2025 1514)  My Heart Failure Symptoms To Do List:   begin a heart failure diary   bring diary to all appointments   develop an action plan   eat more whole grains, fruits and vegetables, lean meats and healthy fats   know when to call the doctor   track symptoms and what helps to feel better or worse   follow rescue plan if symptoms flare up  Goal: Track and Manage Activity and Exertion  Outcome: Progressing  Goal: Optimal Care Coordination of a Patient Experiencing Heart Failure  Outcome: Progressing  Intervention: Identify and Minimize Risk of Heart Failure Exacerbation  Flowsheets (Taken 2/13/2025 1514)  Identify and Minimize Risk of Heart Failure Exacerbation:   medication-adherence assessment completed   self-awareness of  signs/symptoms of worsening disease encouraged   healthy lifestyle encouraged  Intervention: Identify and Manage Comorbidities and Complications  Flowsheets (Taken 2/13/2025 1514)  Identify and Manage Comorbidities and Complications:   healthy lifestyle encouraged   self-awareness of signs/symptoms of worsening disease encouraged   signs/symptoms of comorbidities identified   response to pharmacologic therapy monitored

## 2025-02-14 LAB
BACTERIA SPEC AEROBE CULT: NORMAL
BACTERIA SPEC AEROBE CULT: NORMAL

## 2025-03-26 ENCOUNTER — PATIENT OUTREACH (OUTPATIENT)
Dept: CASE MANAGEMENT | Facility: OTHER | Age: 85
End: 2025-03-26
Payer: MEDICARE

## 2025-03-26 NOTE — OUTREACH NOTE
AMBULATORY CASE MANAGEMENT NOTE    Names and Relationships of Patient/Support Persons: Contact: Elmo Martinez; Relationship: Self -     Patient Outreach    Scheduled outreach with patient for program progression. Patient reports doing well, saw PCP 3/19/25 and is still maintaining her O2 sats in the 90's on room air, b/p WNL. Pt reports minimal edema and no significant weight fluctuations. Pt reports being able to complete ADL's without significant dyspnea, paces activity to avoid over-tiring. Education provided, understanding voiced. Care plan goals reviewed, updated. Future outreach scheduled.     Education Documentation  Weight Monitoring, taught by Andreia Washington, RN at 3/26/2025  1:15 PM.  Learner: Patient  Readiness: Acceptance  Method: Explanation  Response: Verbalizes Understanding    Self-Care, taught by Andreia Washington, RN at 3/26/2025  1:15 PM.  Learner: Patient  Readiness: Acceptance  Method: Explanation  Response: Verbalizes Understanding    Physical Activity, taught by Andreia Washington, RN at 3/26/2025  1:15 PM.  Learner: Patient  Readiness: Acceptance  Method: Explanation  Response: Verbalizes Understanding    Hypertension, taught by Andreia Washington, RN at 3/26/2025  1:15 PM.  Learner: Patient  Readiness: Acceptance  Method: Explanation  Response: Verbalizes Understanding    Signs/Symptoms, taught by Andreia Washington, RN at 3/26/2025  1:15 PM.  Learner: Patient  Readiness: Acceptance  Method: Explanation  Response: Verbalizes Understanding    Blood Pressure Monitoring, taught by Andreia Washington, RN at 3/26/2025  1:15 PM.  Learner: Patient  Readiness: Acceptance  Method: Explanation  Response: Verbalizes Understanding          Andreia CENTENO  Ambulatory Case Management    3/26/2025, 13:15 EDT

## 2025-03-26 NOTE — PLAN OF CARE
Problem: Heart Failure  Goal: Track and Manage Fluids and Swelling  Outcome: Progressing  Goal: Track and Manage Symptoms  Outcome: Progressing  Goal: Track and Manage Activity and Exertion  Outcome: Progressing  Goal: Optimal Care Coordination of a Patient Experiencing Heart Failure  Outcome: Progressing  Intervention: Alleviate Barriers to Optimal Nutrition and Fluid Status  Flowsheets (Taken 3/26/2025 1312)  Alleviate Barriers to Optimal Nutrition and Fluid Status:   home monitoring of weight gain or loss encouraged   weight gain or loss reviewed and trended  Intervention: Maintain Strength and Functional Ability  Flowsheets (Taken 3/26/2025 1313)  Maintain Strength and Functional Ability:   activity or exercise based on tolerance encouraged   breathing techniques encouraged   maximum independence promoted   participation in cardiac rehabilitation encouraged   self-care encouraged   energy conservation techniques promoted   daily activity/exercise promoted  Intervention: Monitor and Manage Sleep Disturbance  Flowsheets (Taken 3/26/2025 1313)  Monitor and Manage Sleep Disturbance: sleep hygiene techniques encouraged  Intervention: Support Psychosocial Response to Heart Failure  Flowsheets (Taken 3/26/2025 1313)  Support Psychosocial Response to Heart Failure:   family involvement promoted   verbalization of feelings encouraged  Intervention: Develop and Maintain Palliative Care Plan  Flowsheets (Taken 3/26/2025 1313)  Develop and Maintain Palliative Care Plan:   decision-making supported   affirmation provided   active listening utilized   positive reinforcement provided   goal-setting facilitated

## 2025-04-25 ENCOUNTER — PATIENT OUTREACH (OUTPATIENT)
Dept: CASE MANAGEMENT | Facility: OTHER | Age: 85
End: 2025-04-25
Payer: MEDICARE

## 2025-04-25 NOTE — PLAN OF CARE
Problem: Heart Failure  Goal: Track and Manage Fluids and Swelling  Outcome: Progressing  Goal: Track and Manage Symptoms  Outcome: Progressing  Intervention: My Heart Failure Symptoms To Do List  Description:   Why is this important?  You will be able to handle your symptoms better if you keep track of them.  Making some simple changes to your lifestyle will help.  Knowing how to self-manage shortness of breath and when to call the doctor is very important.  Eating healthy is one thing you can do to take care of yourself.    Flowsheets (Taken 4/25/2025 1455)  My Heart Failure Symptoms To Do List:   begin a heart failure diary   bring diary to all appointments   follow rescue plan if symptoms flare up   know when to call the doctor   rest in an upright position if breathing is hard   track symptoms and what helps to feel better or worse  Goal: Track and Manage Activity and Exertion  Outcome: Progressing  Intervention: My Activity and Exertion To Do List  Description:   Why is this important?  Exercising is very important when managing your heart failure.  It will help your heart get stronger.  Wearing a pedometer or using a fitness tracker can help you stay motivated.    Flowsheets (Taken 4/25/2025 1455)  My Activity and Exertion To Do List:   avoid heavy exercise on very hot days   drink water to stay hydrated during exercise   follow activity or exercise plan   make an activity or exercise plan for home   pace activity allowing for rest   warm up and cool down for 10 minutes before and after activity or exercise   track symptoms during activity in diary  Goal: Optimal Care Coordination of a Patient Experiencing Heart Failure  Outcome: Progressing  Intervention: Identify and Manage Comorbidities and Complications  Flowsheets (Taken 4/25/2025 1455)  Identify and Manage Comorbidities and Complications:   difficulty in making life-long changes acknowledged   healthy lifestyle encouraged   home monitoring of blood  pressure encouraged   response to pharmacologic therapy monitored   signs/symptoms of comorbidities identified   self-awareness of signs/symptoms of worsening disease encouraged  Intervention: Alleviate Barriers to Optimal Nutrition and Fluid Status  Flowsheets (Taken 4/25/2025 1455)  Alleviate Barriers to Optimal Nutrition and Fluid Status:   optimal nutrition intake promoted   weight gain or loss reviewed and trended   home monitoring of weight gain or loss encouraged  Intervention: Maintain Strength and Functional Ability  Flowsheets (Taken 4/25/2025 1455)  Maintain Strength and Functional Ability:   activity or exercise based on tolerance encouraged   energy conservation techniques promoted   daily activity/exercise promoted   breathing techniques encouraged   maximum independence promoted   self-care encouraged

## 2025-04-25 NOTE — OUTREACH NOTE
AMBULATORY CASE MANAGEMENT NOTE    Names and Relationships of Patient/Support Persons: Contact: Elmo Martinez; Relationship: Self -     Patient Outreach    Scheduled outreach with patient for program progression. Patient reports feeling well today, does have some edema to feet/lower extremities but not outside of baseline. Patient reports weighing daily with no significant gains. Pt experiences neuropathy to feet/toes, has been using Bob's Salve for relief. No recent falls reported; no s/s exac of disease. Patient states she has an upcoming cardiology apt in [place] 5/20/25; may discuss ablation or another procedure for a-fib. Education provided, understanding voiced. Care plan goals reviewed, updated. Future outreach scheduled.     Education Documentation  Signs/Symptoms, taught by Andreia Washington, RN at 4/25/2025  2:56 PM.  Learner: Patient  Readiness: Acceptance  Method: Explanation  Response: Verbalizes Understanding    Weight Monitoring, taught by Andreia Washington, RN at 4/25/2025  2:56 PM.  Learner: Patient  Readiness: Acceptance  Method: Explanation  Response: Verbalizes Understanding    VTE Prevention, taught by Andreia Washington RN at 4/25/2025  2:56 PM.  Learner: Patient  Readiness: Acceptance  Method: Explanation  Response: Verbalizes Understanding    Self-Care, taught by Andreia Washington, RN at 4/25/2025  2:56 PM.  Learner: Patient  Readiness: Acceptance  Method: Explanation  Response: Verbalizes Understanding    Unresolved/Worsening Symptoms, taught by Andreia Washington, RN at 4/25/2025  2:56 PM.  Learner: Patient  Readiness: Acceptance  Method: Explanation  Response: Verbalizes Understanding    Pulse Monitoring, taught by Andreia Washington RN at 4/25/2025  2:56 PM.  Learner: Patient  Readiness: Acceptance  Method: Explanation  Response: Verbalizes Understanding    Signs/Symptoms, taught by Andreia Washington RN at 4/25/2025  2:56 PM.  Learner: Patient  Readiness: Acceptance  Method: Explanation  Response:  Verbalizes Understanding    Unresolved/Worsening Symptoms, taught by Andreia Washington, RN at 4/25/2025  2:56 PM.  Learner: Patient  Readiness: Acceptance  Method: Explanation  Response: Verbalizes Understanding    Blood Pressure Monitoring, taught by Andreia Washington, RN at 4/25/2025  2:56 PM.  Learner: Patient  Readiness: Acceptance  Method: Explanation  Response: Verbalizes Understanding          Andreia CENTENO  Ambulatory Case Management    4/25/2025, 14:56 EDT

## 2025-05-20 ENCOUNTER — OFFICE VISIT (OUTPATIENT)
Dept: CARDIOLOGY | Facility: CLINIC | Age: 85
End: 2025-05-20
Payer: MEDICARE

## 2025-05-20 VITALS
WEIGHT: 175.4 LBS | HEART RATE: 85 BPM | DIASTOLIC BLOOD PRESSURE: 78 MMHG | SYSTOLIC BLOOD PRESSURE: 122 MMHG | OXYGEN SATURATION: 95 % | BODY MASS INDEX: 32.28 KG/M2 | HEIGHT: 62 IN

## 2025-05-20 DIAGNOSIS — I48.21 PERMANENT ATRIAL FIBRILLATION: ICD-10-CM

## 2025-05-20 DIAGNOSIS — I65.22 CAROTID STENOSIS, ASYMPTOMATIC, LEFT: Primary | ICD-10-CM

## 2025-05-20 DIAGNOSIS — I50.32 CHRONIC DIASTOLIC CONGESTIVE HEART FAILURE: ICD-10-CM

## 2025-05-20 RX ORDER — NITROGLYCERIN 0.4 MG/1
0.4 TABLET SUBLINGUAL
Qty: 20 TABLET | Refills: 3 | Status: SHIPPED | OUTPATIENT
Start: 2025-05-20

## 2025-05-20 RX ORDER — EVOLOCUMAB 140 MG/ML
140 INJECTION, SOLUTION SUBCUTANEOUS
Qty: 2 ML | Refills: 11 | Status: SHIPPED | OUTPATIENT
Start: 2025-05-20

## 2025-05-20 NOTE — PROGRESS NOTES
"[facility]    Encounter Date: 2025    Patient ID: Elmo Martinez is a 84 y.o. female.  : 1940     PCP: Herman Bob PA       Chief Complaint: Longstanding persistent atrial fibrillation    PROBLEM LIST:  Atrial fibrillation  2 week monitor 2021: SR with occasional PACs with short runs of SVT vs possible Afib   Echo 2021: EF 66-70%, mild AS, moderate MR, mild MS, elevated RVSP  ECG 23 - atrial fibrillation, ventricular rate 85bpm  Chest pain  Echocardiogram with LVEF >65%, mild LVH 3/29/13  Normal regadenoson cardiolite stress with LVEF 76% 3/29/13  Echocardiogram LVEF 60%-65%, mod MR, mild to mod TR and evidence severe pulmonary HTN 16  Abnormal lexiscan nuclear stress 16  [facility], 2016:  Normal LV function, normal coronaries, normal pulmonary artery pressures.  Echo, 2018: \"severe PA hypertension\" and mild MS\"  21 - TTE - EF 66-70%, Grade II diastolic dysfunction, Mild AS (mean grad 11mmHG, peak V 241m/s), MAC, mod MR, mild MS, mild TR, RVSP 54mmHg  3/18/24 - TTE ([place]) - EF 50 +/-5%, moderate LVH, mod RV dilation, mod RV hypokinesis, severe bi-atrial dilation, moderate AS, mod-sev TR, severe TR, RVSP 70mmHG    Valvular heart disease  Moderate AS  Mod MR, mild MS (not seen / reported on last echo)  Mod-sevever TR  HFpEF  Pulmonary hypertension   Hypertension  Dyslipidemia, intolerant to statins secondary to myalgias and elevated CPK   Well controled on PSK9 inhibitor  Diabetes Mellitus with neuropathy  Carotid artery disease  Carotid duplex 2020: LICA 50-69% stenosis, asymptomatic   GERD and hiatal hernia  Osteoarthritis  Skin cancer  Glaucoma  History of gout  Scoliosis  Small stomach ulcers  Surgical history: hysterectomy, glaucoma, appendectomy       History of Present Illness  Elmo Martinez is a 84 y.o. with a history of atrial fibrillation and cardiac risk factors.  Since her last office visit in November " 2024 she had an ED visit at [facility] with dyspnea with imaging showing mild pulmonary vascular congestion and small bilateral pleural effusions.  BNP at that time was elevated to 5954.    She has been off of diltiazem with lower blood pressures at times.  Her heart rates are typically controlled on her metoprolol.  Aside from her ER visit she has not had any hospital admissions since March of last year.  Her physical activity remains largely limited by arthritis but she is able to move around the house.  She does have leg swelling which improves with elevation however this worsens her neuropathy symptoms so she frequently leaves her feet down.  Overall the swelling has been stable with no recent change in dyspnea.      Allergies   Allergen Reactions    Codeine Mental Status Change    Meperidine Mental Status Change    Morphine Mental Status Change    Statins Myalgia     Zocor, lipitor, welchol and pravachol    Sulfa Antibiotics Rash    Other Swelling     Swelling at site of insertion    Imdur [Isosorbide Nitrate] Mental Status Change    Influenza Virus Vaccine Swelling         Current Outpatient Medications:     allopurinol (ZYLOPRIM) 300 MG tablet, Take 1 tablet by mouth As Needed., Disp: , Rfl:     ALPRAZolam (XANAX) 0.5 MG tablet, Take 1 tablet by mouth 2 (Two) Times a Day As Needed. Takes 0.25 prn, Disp: , Rfl:     apixaban (ELIQUIS) 5 MG tablet tablet, Take 1 tablet by mouth Every 12 (Twelve) Hours., Disp: 180 tablet, Rfl: 3    aspirin 81 MG tablet, Take 1 tablet by mouth Daily., Disp: , Rfl:     brimonidine (ALPHAGAN) 0.2 % ophthalmic solution, 1 drop 2 (Two) Times a Day., Disp: , Rfl:     bumetanide (BUMEX) 1 MG tablet, Take 1 tablet by mouth Daily., Disp: , Rfl:     dorzolamide-timolol (COSOPT) 22.3-6.8 MG/ML ophthalmic solution, Administer 1 drop to both eyes 2 (Two) Times a Day., Disp: , Rfl:     Evolocumab (Repatha SureClick) solution auto-injector SureClick injection, Inject 1 mL under the skin into the  "appropriate area as directed Every 14 (Fourteen) Days., Disp: 2 mL, Rfl: 11    FIBER PO, Take  by mouth Daily., Disp: , Rfl:     meclizine (ANTIVERT) 25 MG tablet, Take 1 tablet by mouth 3 (Three) Times a Day As Needed for Dizziness., Disp: , Rfl:     metoprolol tartrate (LOPRESSOR) 100 MG tablet, Take 1 tablet by mouth 2 (Two) Times a Day., Disp: 180 tablet, Rfl: 3    nitroglycerin (NITROSTAT) 0.4 MG SL tablet, Place 1 tablet under the tongue Every 5 (Five) Minutes As Needed for Chest Pain. Take no more than 3 doses in 15 minutes., Disp: , Rfl:     ondansetron (ZOFRAN) 4 MG tablet, Take 1 tablet by mouth Every 8 (Eight) Hours As Needed for Nausea or Vomiting., Disp: , Rfl:     pantoprazole (PROTONIX) 40 MG EC tablet, Take 1 tablet by mouth Daily., Disp: , Rfl:     The following portions of the patient's history were reviewed and updated as appropriate: allergies, current medications, past family history, past medical history, past social history, past surgical history and problem list.    Review of Systems   Cardiovascular:  Positive for irregular heartbeat and leg swelling. Negative for chest pain and dyspnea on exertion.   Musculoskeletal:  Positive for arthritis and back pain. Negative for falls.             Objective:     Visit Vitals  /78 (BP Location: Right arm, Patient Position: Sitting, Cuff Size: Adult)   Pulse 85   Ht 157.5 cm (62\")   Wt 79.6 kg (175 lb 6.4 oz)   LMP  (LMP Unknown)   SpO2 95%   BMI 32.08 kg/m²   Physical Exam  Constitutional: Older white female, sitting up in wheelchair  HENT: HEENT exam unremarkable.   Neck: Neck supple. No JVD present. No carotid bruits.   Cardiovascular: Normal rate, irregularly irregular, distant heart sounds, III/VI systolic murmur at LUSB, LLSB, apex, 2+ symmetric pulses.   Pulmonary/Chest: Breath sounds normal. Does not exhibit tenderness.   Abdominal: Abdomen benign.   Musculoskeletal: 1+ bilateral LE edema.   Neurological: Neurological exam unremarkable. "     Data Review:     1/23/25   - Lipids: TC 99, Triglycerides 279, HDL 42, LDL 17    Lab Results   Component Value Date    WBC 9.53 02/09/2025    HGB 10.6 (L) 02/09/2025    HCT 35.5 02/09/2025    MCV 87.0 02/09/2025     02/09/2025      Lab Results   Component Value Date    GLUCOSE 109 (H) 02/09/2025    BUN 17 02/09/2025    CREATININE 0.99 02/09/2025    EGFR 56.7 (L) 02/09/2025    BCR 17.2 02/09/2025    K 4.3 02/09/2025    CO2 26.2 02/09/2025    CALCIUM 9.1 02/09/2025    ALBUMIN 3.6 02/09/2025    BILITOT 0.8 02/09/2025    AST 13 02/09/2025    ALT 5 02/09/2025        Procedures     Advance Care Planning   ACP discussion was declined by the patient. Patient has an advance directive in EMR which is still valid.            Assessment:     Diagnosis Plan   Atrial fibrillation, permanent acceptable rates today     continue on Eliquis 5 mg BID, and diltiazem 180, metoprolol 100 mg     Chronic HFpEF  Valvular heart disease  Pulmonary hypertension   Continue daily bumetanide  BP precludes addition of spironolactone or ARB  No SGLT2 inhibitor due to recurrent UTI   Essential hypertension  Controlled.  Continue current medications     Mixed hyperlipidemia  Well controlled. Continue on repatha 140 mg.      Hx asymptomatic carotid stenosis Repeat US ordered       Plan:   Stable symptoms with some variable blood pressure at home which appears largely asymptomatic  No medication changes at this time  Repeat carotid ultrasound ordered  Return in about 6 months (around 11/20/2025).   Thank you for allowing us to participate in the care of your patient.     MARIA EUGENIA Hagan MD  05/20/25

## 2025-05-22 DIAGNOSIS — I65.22 CAROTID STENOSIS, ASYMPTOMATIC, LEFT: Primary | ICD-10-CM

## 2025-05-28 ENCOUNTER — PATIENT OUTREACH (OUTPATIENT)
Dept: CASE MANAGEMENT | Facility: OTHER | Age: 85
End: 2025-05-28
Payer: MEDICARE

## 2025-05-28 NOTE — PLAN OF CARE
Problem: Heart Failure  Goal: Track and Manage Fluids and Swelling  Outcome: Met  Intervention: My Fluids and Swelling To Do List  Description:   Why is this important?  It is important to check your weight at home every day.  Also, check for swelling in your feet and legs every day.  Keeping your legs up while you are sitting and doing ankle pump exercises will help with the swelling.    Goal: Track and Manage Symptoms  Outcome: Met  Intervention: My Heart Failure Symptoms To Do List  Description:   Why is this important?  You will be able to handle your symptoms better if you keep track of them.  Making some simple changes to your lifestyle will help.  Knowing how to self-manage shortness of breath and when to call the doctor is very important.  Eating healthy is one thing you can do to take care of yourself.    Goal: Track and Manage Activity and Exertion  Outcome: Met  Intervention: My Activity and Exertion To Do List  Description:   Why is this important?  Exercising is very important when managing your heart failure.  It will help your heart get stronger.  Wearing a pedometer or using a fitness tracker can help you stay motivated.    Goal: Optimal Care Coordination of a Patient Experiencing Heart Failure  Outcome: Met  Intervention: Identify and Minimize Risk of Heart Failure Exacerbation  Intervention: Identify and Manage Comorbidities and Complications  Flowsheets (Taken 5/28/2025 1326)  Identify and Manage Comorbidities and Complications:   difficulty in making life-long changes acknowledged   healthy lifestyle encouraged   self-awareness of signs/symptoms of worsening disease encouraged   signs/symptoms of comorbidities identified   response to pharmacologic therapy monitored   home monitoring of blood pressure encouraged  Intervention: Alleviate Barriers to Optimal Nutrition and Fluid Status  Intervention: Maintain Strength and Functional Ability  Intervention: Monitor and Manage Sleep  Disturbance  Intervention: Support Psychosocial Response to Heart Failure  Intervention: Develop and Maintain Palliative Care Plan

## 2025-05-28 NOTE — OUTREACH NOTE
AMBULATORY CASE MANAGEMENT NOTE    Names and Relationships of Patient/Support Persons: Contact: Elmo Martinez; Relationship: Self -     Patient Outreach    Scheduled outreach with patient for program progression. Patient reports feeling well with no s/s exac of disease. Pt had an OV with PCP as well as cardiology recently with good reports. Blood pressure well managed. Pt continues to be compliant with meds, daily weights, safety/fall precautions. Care plan completed, pt ready for monitoring for graduation from program for this episode.     Education Documentation  Unresolved/Worsening Symptoms, taught by Andreia Washington, RN at 5/28/2025  1:27 PM.  Learner: Patient  Readiness: Acceptance  Method: Explanation  Response: Verbalizes Understanding    Pulse Monitoring, taught by Andreia Washington, RN at 5/28/2025  1:27 PM.  Learner: Patient  Readiness: Acceptance  Method: Explanation  Response: Verbalizes Understanding    Provider Follow-Up, taught by Andreia Washington, RN at 5/28/2025  1:27 PM.  Learner: Patient  Readiness: Acceptance  Method: Explanation  Response: Verbalizes Understanding    Signs/Symptoms, taught by Andreia Washington, RN at 5/28/2025  1:27 PM.  Learner: Patient  Readiness: Acceptance  Method: Explanation  Response: Verbalizes Understanding          Andreia CENTENO  Ambulatory Case Management    5/28/2025, 13:27 EDT

## 2025-06-04 ENCOUNTER — HOSPITAL ENCOUNTER (OUTPATIENT)
Facility: HOSPITAL | Age: 85
Discharge: HOME | End: 2025-06-04
Admitting: INTERNAL MEDICINE
Payer: MEDICARE

## 2025-06-04 DIAGNOSIS — I65.22 CAROTID STENOSIS, ASYMPTOMATIC, LEFT: ICD-10-CM

## 2025-06-04 PROCEDURE — 93880 EXTRACRANIAL BILAT STUDY: CPT
